# Patient Record
Sex: MALE | Race: ASIAN | NOT HISPANIC OR LATINO | URBAN - METROPOLITAN AREA
[De-identification: names, ages, dates, MRNs, and addresses within clinical notes are randomized per-mention and may not be internally consistent; named-entity substitution may affect disease eponyms.]

---

## 2017-10-18 ENCOUNTER — EMERGENCY (EMERGENCY)
Facility: HOSPITAL | Age: 47
LOS: 1 days | Discharge: HOME | End: 2017-10-18
Attending: STUDENT IN AN ORGANIZED HEALTH CARE EDUCATION/TRAINING PROGRAM | Admitting: STUDENT IN AN ORGANIZED HEALTH CARE EDUCATION/TRAINING PROGRAM
Payer: COMMERCIAL

## 2017-10-18 VITALS
HEIGHT: 71 IN | RESPIRATION RATE: 14 BRPM | WEIGHT: 188.05 LBS | DIASTOLIC BLOOD PRESSURE: 93 MMHG | SYSTOLIC BLOOD PRESSURE: 142 MMHG | HEART RATE: 77 BPM | TEMPERATURE: 97 F | OXYGEN SATURATION: 97 %

## 2017-10-18 DIAGNOSIS — K51.90 ULCERATIVE COLITIS, UNSPECIFIED, WITHOUT COMPLICATIONS: ICD-10-CM

## 2017-10-18 DIAGNOSIS — I10 ESSENTIAL (PRIMARY) HYPERTENSION: ICD-10-CM

## 2017-10-18 DIAGNOSIS — L02.01 CUTANEOUS ABSCESS OF FACE: ICD-10-CM

## 2017-10-18 DIAGNOSIS — R22.0 LOCALIZED SWELLING, MASS AND LUMP, HEAD: ICD-10-CM

## 2017-10-18 DIAGNOSIS — E03.9 HYPOTHYROIDISM, UNSPECIFIED: ICD-10-CM

## 2017-10-18 LAB
ANION GAP SERPL CALC-SCNC: 8 MMOL/L — SIGNIFICANT CHANGE UP (ref 5–17)
BASOPHILS # BLD AUTO: 0.1 K/UL — SIGNIFICANT CHANGE UP (ref 0–0.2)
BASOPHILS NFR BLD AUTO: 1 % — SIGNIFICANT CHANGE UP (ref 0–2)
BUN SERPL-MCNC: 15 MG/DL — SIGNIFICANT CHANGE UP (ref 7–23)
CALCIUM SERPL-MCNC: 9.3 MG/DL — SIGNIFICANT CHANGE UP (ref 8.5–10.1)
CHLORIDE SERPL-SCNC: 106 MMOL/L — SIGNIFICANT CHANGE UP (ref 96–108)
CO2 SERPL-SCNC: 26 MMOL/L — SIGNIFICANT CHANGE UP (ref 22–31)
CREAT SERPL-MCNC: 1 MG/DL — SIGNIFICANT CHANGE UP (ref 0.5–1.3)
EOSINOPHIL # BLD AUTO: 0.4 K/UL — SIGNIFICANT CHANGE UP (ref 0–0.5)
EOSINOPHIL NFR BLD AUTO: 3.4 % — SIGNIFICANT CHANGE UP (ref 0–6)
GLUCOSE SERPL-MCNC: 128 MG/DL — HIGH (ref 70–99)
HCT VFR BLD CALC: 46.2 % — SIGNIFICANT CHANGE UP (ref 39–50)
HGB BLD-MCNC: 14.3 G/DL — SIGNIFICANT CHANGE UP (ref 13–17)
LYMPHOCYTES # BLD AUTO: 3.5 K/UL — HIGH (ref 1–3.3)
LYMPHOCYTES # BLD AUTO: 33.4 % — SIGNIFICANT CHANGE UP (ref 13–44)
MCHC RBC-ENTMCNC: 28.2 PG — SIGNIFICANT CHANGE UP (ref 27–34)
MCHC RBC-ENTMCNC: 31.1 GM/DL — LOW (ref 32–36)
MCV RBC AUTO: 90.6 FL — SIGNIFICANT CHANGE UP (ref 80–100)
MONOCYTES # BLD AUTO: 0.7 K/UL — SIGNIFICANT CHANGE UP (ref 0–0.9)
MONOCYTES NFR BLD AUTO: 6.9 % — SIGNIFICANT CHANGE UP (ref 1–9)
NEUTROPHILS # BLD AUTO: 5.9 K/UL — SIGNIFICANT CHANGE UP (ref 1.8–7.4)
NEUTROPHILS NFR BLD AUTO: 55.3 % — SIGNIFICANT CHANGE UP (ref 43–77)
PLATELET # BLD AUTO: 421 K/UL — HIGH (ref 150–400)
POTASSIUM SERPL-MCNC: 4.1 MMOL/L — SIGNIFICANT CHANGE UP (ref 3.5–5.3)
POTASSIUM SERPL-SCNC: 4.1 MMOL/L — SIGNIFICANT CHANGE UP (ref 3.5–5.3)
RBC # BLD: 5.09 M/UL — SIGNIFICANT CHANGE UP (ref 4.2–5.8)
RBC # FLD: 13.4 % — SIGNIFICANT CHANGE UP (ref 10.3–14.5)
SODIUM SERPL-SCNC: 140 MMOL/L — SIGNIFICANT CHANGE UP (ref 135–145)
WBC # BLD: 10.6 K/UL — HIGH (ref 3.8–10.5)
WBC # FLD AUTO: 10.6 K/UL — HIGH (ref 3.8–10.5)

## 2017-10-18 PROCEDURE — 85027 COMPLETE CBC AUTOMATED: CPT

## 2017-10-18 PROCEDURE — 99284 EMERGENCY DEPT VISIT MOD MDM: CPT | Mod: 25

## 2017-10-18 PROCEDURE — 96365 THER/PROPH/DIAG IV INF INIT: CPT

## 2017-10-18 PROCEDURE — 80048 BASIC METABOLIC PNL TOTAL CA: CPT

## 2017-10-18 PROCEDURE — 36415 COLL VENOUS BLD VENIPUNCTURE: CPT

## 2017-10-18 PROCEDURE — 70480 CT ORBIT/EAR/FOSSA W/O DYE: CPT

## 2017-10-18 PROCEDURE — 87040 BLOOD CULTURE FOR BACTERIA: CPT

## 2017-10-18 PROCEDURE — 99285 EMERGENCY DEPT VISIT HI MDM: CPT | Mod: 25

## 2017-10-18 PROCEDURE — 70480 CT ORBIT/EAR/FOSSA W/O DYE: CPT | Mod: 26

## 2017-10-18 RX ORDER — VANCOMYCIN HCL 1 G
1000 VIAL (EA) INTRAVENOUS ONCE
Qty: 0 | Refills: 0 | Status: COMPLETED | OUTPATIENT
Start: 2017-10-18 | End: 2017-10-18

## 2017-10-18 RX ORDER — AZTREONAM 2 G
1 VIAL (EA) INJECTION
Qty: 20 | Refills: 0
Start: 2017-10-18 | End: 2017-10-28

## 2017-10-18 RX ADMIN — Medication 250 MILLIGRAM(S): at 02:15

## 2017-10-18 NOTE — ED ADULT NURSE NOTE - CHPI ED SYMPTOMS NEG
no bleeding at site/no red streaks/no drainage/no vomiting/no pain/no bleeding/no purulent drainage/no chills/no fever

## 2017-10-18 NOTE — ED ADULT NURSE NOTE - DISCHARGE TEACHING
f/u with Dermatologist and PMD, continue taking ordered meds and start taking new antibiotics in the morning. Boss with food

## 2017-10-18 NOTE — ED PROVIDER NOTE - OBJECTIVE STATEMENT
47 year old male with a history of HTN, UC, hypothyroid presents with right cheek swelling and abscess. Patient states he noted a small "pimple" on his right cheek 5 days ago.  He went to see a dermatologist the following day who gave patient a cortisone shot and doxycycline 100mg. Patient did not take the abx because the area did not worsen for the next 2 days. He saw another dermatologist yesterday, given Mupiricon ointment TID and then started doxy.  Last night, he noted that the abscess became bigger and more painful.  Applying warm soaks with no relief. No fevers. He also has mild right eye pain.  He has seen an ophthalmologist as well and was told his eye was healthy. Patient has SUZY, sleeps with a CPAP mask and suspects that the edge of the mask resulted in an abrasion on his cheek. Wife states he has had cellulitis and abscess multiple times in the past.  PMD Dr. Pool

## 2017-10-18 NOTE — ED PROVIDER NOTE - PROGRESS NOTE DETAILS
Patient looks well. Does not want I&D.  Will continue soak, vancomycin given.  Advised to continue doxy, bactrim added and return to the ER if worsening or no improvement

## 2017-10-18 NOTE — ED ADULT NURSE NOTE - OBJECTIVE STATEMENT
47 year old male presents to the ED with c/o right cheek swelling. A+O x 4. Wife at the bedside. States he had a cortisone shot last week in the affected area, but the swelling and redness worsened. Pt reports pain scale 6, irritation and is worried the infection might be spreading to his eye. Denies headache, fever, chills, dizziness, syncope, sob, CP, back pain, or abdominal pain. Denies NVD. S1/S2. Respirations even and unlabored. Lungs clear bilaterally. skin warm and dry. Small raised red abscess noted to the right cheek. No drainage, discharge, or bleeding noted from the abscess. Abscess hot to the touch. # 20 IV access started L AC. Blood collected and sent to lab. Will continue to monitor.

## 2017-10-18 NOTE — ED PROVIDER NOTE - HEAD, MLM
Head is atraumatic. Head shape is symmetrical. Right cheek with 3cm x 2cm abscess. Firm, tender, minimal surrounding cellulitis

## 2017-10-23 LAB
CULTURE RESULTS: SIGNIFICANT CHANGE UP
CULTURE RESULTS: SIGNIFICANT CHANGE UP
SPECIMEN SOURCE: SIGNIFICANT CHANGE UP
SPECIMEN SOURCE: SIGNIFICANT CHANGE UP

## 2019-07-25 ENCOUNTER — INPATIENT (INPATIENT)
Facility: HOSPITAL | Age: 49
LOS: 2 days | Discharge: HOME | DRG: 176 | End: 2019-07-28
Attending: INTERNAL MEDICINE | Admitting: INTERNAL MEDICINE
Payer: COMMERCIAL

## 2019-07-25 VITALS
DIASTOLIC BLOOD PRESSURE: 83 MMHG | SYSTOLIC BLOOD PRESSURE: 119 MMHG | TEMPERATURE: 99 F | OXYGEN SATURATION: 100 % | HEART RATE: 91 BPM | RESPIRATION RATE: 20 BRPM | WEIGHT: 195.11 LBS | HEIGHT: 72 IN

## 2019-07-25 LAB
ALBUMIN SERPL ELPH-MCNC: 3.9 G/DL — SIGNIFICANT CHANGE UP (ref 3.3–5)
ALP SERPL-CCNC: 106 U/L — SIGNIFICANT CHANGE UP (ref 40–120)
ALT FLD-CCNC: 44 U/L — SIGNIFICANT CHANGE UP (ref 12–78)
ANION GAP SERPL CALC-SCNC: 8 MMOL/L — SIGNIFICANT CHANGE UP (ref 5–17)
APPEARANCE UR: ABNORMAL
AST SERPL-CCNC: 20 U/L — SIGNIFICANT CHANGE UP (ref 15–37)
BACTERIA # UR AUTO: NEGATIVE — SIGNIFICANT CHANGE UP
BILIRUB SERPL-MCNC: 0.7 MG/DL — SIGNIFICANT CHANGE UP (ref 0.2–1.2)
BILIRUB UR-MCNC: NEGATIVE — SIGNIFICANT CHANGE UP
BUN SERPL-MCNC: 12 MG/DL — SIGNIFICANT CHANGE UP (ref 7–23)
CALCIUM SERPL-MCNC: 9.7 MG/DL — SIGNIFICANT CHANGE UP (ref 8.5–10.1)
CHLORIDE SERPL-SCNC: 102 MMOL/L — SIGNIFICANT CHANGE UP (ref 96–108)
CO2 SERPL-SCNC: 26 MMOL/L — SIGNIFICANT CHANGE UP (ref 22–31)
COLOR SPEC: YELLOW — SIGNIFICANT CHANGE UP
COMMENT - URINE: SIGNIFICANT CHANGE UP
CREAT SERPL-MCNC: 1.1 MG/DL — SIGNIFICANT CHANGE UP (ref 0.5–1.3)
DIFF PNL FLD: ABNORMAL
EPI CELLS # UR: SIGNIFICANT CHANGE UP
GLUCOSE SERPL-MCNC: 148 MG/DL — HIGH (ref 70–99)
GLUCOSE UR QL: NEGATIVE — SIGNIFICANT CHANGE UP
HCT VFR BLD CALC: 43.3 % — SIGNIFICANT CHANGE UP (ref 39–50)
HGB BLD-MCNC: 14.1 G/DL — SIGNIFICANT CHANGE UP (ref 13–17)
KETONES UR-MCNC: NEGATIVE — SIGNIFICANT CHANGE UP
LEUKOCYTE ESTERASE UR-ACNC: NEGATIVE — SIGNIFICANT CHANGE UP
LIDOCAIN IGE QN: 123 U/L — SIGNIFICANT CHANGE UP (ref 73–393)
MCHC RBC-ENTMCNC: 28.3 PG — SIGNIFICANT CHANGE UP (ref 27–34)
MCHC RBC-ENTMCNC: 32.6 GM/DL — SIGNIFICANT CHANGE UP (ref 32–36)
MCV RBC AUTO: 86.8 FL — SIGNIFICANT CHANGE UP (ref 80–100)
NITRITE UR-MCNC: NEGATIVE — SIGNIFICANT CHANGE UP
NRBC # BLD: 0 /100 WBCS — SIGNIFICANT CHANGE UP (ref 0–0)
PH UR: 5 — SIGNIFICANT CHANGE UP (ref 5–8)
PLATELET # BLD AUTO: 439 K/UL — HIGH (ref 150–400)
POTASSIUM SERPL-MCNC: 4 MMOL/L — SIGNIFICANT CHANGE UP (ref 3.5–5.3)
POTASSIUM SERPL-SCNC: 4 MMOL/L — SIGNIFICANT CHANGE UP (ref 3.5–5.3)
PROT SERPL-MCNC: 9.4 G/DL — HIGH (ref 6–8.3)
PROT UR-MCNC: 25 MG/DL
RBC # BLD: 4.99 M/UL — SIGNIFICANT CHANGE UP (ref 4.2–5.8)
RBC # FLD: 13.2 % — SIGNIFICANT CHANGE UP (ref 10.3–14.5)
RBC CASTS # UR COMP ASSIST: SIGNIFICANT CHANGE UP /HPF (ref 0–4)
SODIUM SERPL-SCNC: 136 MMOL/L — SIGNIFICANT CHANGE UP (ref 135–145)
SP GR SPEC: 1.01 — SIGNIFICANT CHANGE UP (ref 1.01–1.02)
UROBILINOGEN FLD QL: NEGATIVE — SIGNIFICANT CHANGE UP
WBC # BLD: 16.8 K/UL — HIGH (ref 3.8–10.5)
WBC # FLD AUTO: 16.8 K/UL — HIGH (ref 3.8–10.5)
WBC UR QL: SIGNIFICANT CHANGE UP

## 2019-07-25 RX ORDER — IOHEXOL 300 MG/ML
30 INJECTION, SOLUTION INTRAVENOUS ONCE
Refills: 0 | Status: COMPLETED | OUTPATIENT
Start: 2019-07-25 | End: 2019-07-25

## 2019-07-25 RX ORDER — KETOROLAC TROMETHAMINE 30 MG/ML
30 SYRINGE (ML) INJECTION ONCE
Refills: 0 | Status: DISCONTINUED | OUTPATIENT
Start: 2019-07-25 | End: 2019-07-25

## 2019-07-25 RX ORDER — SODIUM CHLORIDE 9 MG/ML
1000 INJECTION INTRAMUSCULAR; INTRAVENOUS; SUBCUTANEOUS ONCE
Refills: 0 | Status: COMPLETED | OUTPATIENT
Start: 2019-07-25 | End: 2019-07-25

## 2019-07-25 RX ORDER — MUPIROCIN 20 MG/G
1 OINTMENT TOPICAL
Qty: 0 | Refills: 0 | DISCHARGE

## 2019-07-25 RX ORDER — SODIUM CHLORIDE 9 MG/ML
3 INJECTION INTRAMUSCULAR; INTRAVENOUS; SUBCUTANEOUS ONCE
Refills: 0 | Status: COMPLETED | OUTPATIENT
Start: 2019-07-25 | End: 2019-07-25

## 2019-07-25 RX ADMIN — IOHEXOL 30 MILLILITER(S): 300 INJECTION, SOLUTION INTRAVENOUS at 22:14

## 2019-07-25 RX ADMIN — SODIUM CHLORIDE 1000 MILLILITER(S): 9 INJECTION INTRAMUSCULAR; INTRAVENOUS; SUBCUTANEOUS at 21:38

## 2019-07-25 RX ADMIN — Medication 30 MILLIGRAM(S): at 21:38

## 2019-07-25 RX ADMIN — SODIUM CHLORIDE 1000 MILLILITER(S): 9 INJECTION INTRAMUSCULAR; INTRAVENOUS; SUBCUTANEOUS at 22:14

## 2019-07-25 RX ADMIN — SODIUM CHLORIDE 3 MILLILITER(S): 9 INJECTION INTRAMUSCULAR; INTRAVENOUS; SUBCUTANEOUS at 21:38

## 2019-07-25 NOTE — ED PROVIDER NOTE - PHYSICAL EXAMINATION
Physical Exam:  General: Well developed male, well nourished, mild acute distress d/t pain  CV: RRR, +S1/S2, no murmurs, rubs or gallops  Respiratory: CTA B/L, No W/R/R  Abdominal: Guarding on exam because of pain, generalized TTP, +BSx4, +R CVA tenderness  Neurology: AAOx3

## 2019-07-25 NOTE — ED PROVIDER NOTE - OBJECTIVE STATEMENT
Patient is a 50 y/o male with PMHx of colitis (on Lialda and Budesonide), HTN, and hypothyroidism who presents with a 4 days history of R flank pain. Pain is located along the R mid-axillary line, lateral to the R CVA, 10/10 stabbing, sharp pain, exacerbated by movement and taking a deep breath, without radiation. Pain was intermittent until 4-5pm today when it became a constant stabbing pain, prompting his visit to \[facility]"" ED. Patient saw his PCP (Dr. Pool) for the pain Monday and said he did a CXR and EKG, both which were normal, and he did not prescribe any medication. Patient describes associated shortness of breath as he catches his breath due to pain in his R flank with deep breathing. Denies ever having pain like this before, denies previous hx of kidney stones. Endorses chills this morning. Denies fever, n/v/d, urinary symptoms, blood in the urine, blood in the stool.

## 2019-07-25 NOTE — ED PROVIDER NOTE - CLINICAL SUMMARY MEDICAL DECISION MAKING FREE TEXT BOX
Pt presenting with right flank pain with radiation to RUQ x days now constant.  Concern for kidney stone vs MSK vs intra-abdominal pathology.  Will obtain screening labs, UA, and medicate for symptoms.  Will obtain CT based on results of labs

## 2019-07-25 NOTE — ED ADULT NURSE NOTE - OBJECTIVE STATEMENT
patient a/o x 4 with a calm affect c/o right flank pains with intermittent spikes in pain level.  patient denies leg pains or chest pains.

## 2019-07-25 NOTE — ED ADULT TRIAGE NOTE - CHIEF COMPLAINT QUOTE
worsening right flank/back pain +SOB was seen by MD on Monday d/c'd with no new medications was told "he's ok"

## 2019-07-25 NOTE — ED PROVIDER NOTE - ATTENDING CONTRIBUTION TO CARE
Pt is a 50 yo male who presents to the ED with a cc of right flank pain.  PMHx of colitis (on Lialda and Budesonide), HTN, and hypothyroidism.  Pt reports that he has been suffering from right flank pain for the last several days but that this afternoon the pain which has originally been intermittent in nature became constant.  He reports that the pain is sharp and stabbing in nature and radiates to his RUQ.  Pain is worsen with movement and deep breathing.  He reports mild SOB because he reports that taking a tabby breath worsens the pain.  Denies prior similar episodes.  Denies trauma to the region.  Denies numbness or tingling to ext.  Denies fever, chills, N/V, CP.  Denies dysuria, frequency, urgency.  Pt reports that several day ago he had right upper back/shoulder pain.  He was seen by his PMD for this and underwent a work up with no acute findings.  Pt reports that the pain has since improved.  Remainder of chart completed by myself

## 2019-07-25 NOTE — ED PROVIDER NOTE - NS ED ROS FT
Constitutional: denies fever or chills  Respiratory: +SOB, denies cough or wheezing  Cardiovascular: denies CP  Gastrointestinal: + generalized abdominal pain, +R flank pain, denies nausea, vomiting, diarrhea, constipation, or bloody stools  Genitourinary: denies painful urination, increased frequency, urgency, or bloody urine  Musculoskeletal: denies muscle aches  ROS negative except as noted above

## 2019-07-26 DIAGNOSIS — G47.33 OBSTRUCTIVE SLEEP APNEA (ADULT) (PEDIATRIC): ICD-10-CM

## 2019-07-26 DIAGNOSIS — G47.30 SLEEP APNEA, UNSPECIFIED: ICD-10-CM

## 2019-07-26 DIAGNOSIS — I10 ESSENTIAL (PRIMARY) HYPERTENSION: ICD-10-CM

## 2019-07-26 DIAGNOSIS — I26.99 OTHER PULMONARY EMBOLISM WITHOUT ACUTE COR PULMONALE: ICD-10-CM

## 2019-07-26 DIAGNOSIS — Z29.9 ENCOUNTER FOR PROPHYLACTIC MEASURES, UNSPECIFIED: ICD-10-CM

## 2019-07-26 DIAGNOSIS — J45.909 UNSPECIFIED ASTHMA, UNCOMPLICATED: ICD-10-CM

## 2019-07-26 DIAGNOSIS — E03.9 HYPOTHYROIDISM, UNSPECIFIED: ICD-10-CM

## 2019-07-26 DIAGNOSIS — K52.9 NONINFECTIVE GASTROENTERITIS AND COLITIS, UNSPECIFIED: ICD-10-CM

## 2019-07-26 LAB
ALBUMIN SERPL ELPH-MCNC: 3.1 G/DL — LOW (ref 3.3–5)
ALP SERPL-CCNC: 84 U/L — SIGNIFICANT CHANGE UP (ref 40–120)
ALT FLD-CCNC: 37 U/L — SIGNIFICANT CHANGE UP (ref 12–78)
ANION GAP SERPL CALC-SCNC: 9 MMOL/L — SIGNIFICANT CHANGE UP (ref 5–17)
APTT BLD: 143.4 SEC — CRITICAL HIGH (ref 28.5–37)
AST SERPL-CCNC: 19 U/L — SIGNIFICANT CHANGE UP (ref 15–37)
BILIRUB SERPL-MCNC: 0.9 MG/DL — SIGNIFICANT CHANGE UP (ref 0.2–1.2)
BUN SERPL-MCNC: 11 MG/DL — SIGNIFICANT CHANGE UP (ref 7–23)
CALCIUM SERPL-MCNC: 8.1 MG/DL — LOW (ref 8.5–10.1)
CHLORIDE SERPL-SCNC: 107 MMOL/L — SIGNIFICANT CHANGE UP (ref 96–108)
CO2 SERPL-SCNC: 23 MMOL/L — SIGNIFICANT CHANGE UP (ref 22–31)
CREAT SERPL-MCNC: 0.83 MG/DL — SIGNIFICANT CHANGE UP (ref 0.5–1.3)
D DIMER BLD IA.RAPID-MCNC: 956 NG/ML DDU — HIGH
GLUCOSE SERPL-MCNC: 141 MG/DL — HIGH (ref 70–99)
HCT VFR BLD CALC: 34.6 % — LOW (ref 39–50)
HGB BLD-MCNC: 11.4 G/DL — LOW (ref 13–17)
INR BLD: 1.29 RATIO — HIGH (ref 0.88–1.16)
MAGNESIUM SERPL-MCNC: 1.8 MG/DL — SIGNIFICANT CHANGE UP (ref 1.6–2.6)
MCHC RBC-ENTMCNC: 28.2 PG — SIGNIFICANT CHANGE UP (ref 27–34)
MCHC RBC-ENTMCNC: 32.9 GM/DL — SIGNIFICANT CHANGE UP (ref 32–36)
MCV RBC AUTO: 85.6 FL — SIGNIFICANT CHANGE UP (ref 80–100)
NRBC # BLD: 0 /100 WBCS — SIGNIFICANT CHANGE UP (ref 0–0)
NT-PROBNP SERPL-SCNC: 30 PG/ML — SIGNIFICANT CHANGE UP (ref 0–125)
NT-PROBNP SERPL-SCNC: 96 PG/ML — SIGNIFICANT CHANGE UP (ref 0–125)
PHOSPHATE SERPL-MCNC: 2.9 MG/DL — SIGNIFICANT CHANGE UP (ref 2.5–4.5)
PLATELET # BLD AUTO: 386 K/UL — SIGNIFICANT CHANGE UP (ref 150–400)
POTASSIUM SERPL-MCNC: 3.7 MMOL/L — SIGNIFICANT CHANGE UP (ref 3.5–5.3)
POTASSIUM SERPL-SCNC: 3.7 MMOL/L — SIGNIFICANT CHANGE UP (ref 3.5–5.3)
PROT SERPL-MCNC: 7.5 G/DL — SIGNIFICANT CHANGE UP (ref 6–8.3)
PROTHROM AB SERPL-ACNC: 14.8 SEC — HIGH (ref 10–12.9)
RBC # BLD: 4.04 M/UL — LOW (ref 4.2–5.8)
RBC # FLD: 13.2 % — SIGNIFICANT CHANGE UP (ref 10.3–14.5)
SODIUM SERPL-SCNC: 139 MMOL/L — SIGNIFICANT CHANGE UP (ref 135–145)
T4 AB SER-ACNC: 7.1 UG/DL — SIGNIFICANT CHANGE UP (ref 4.6–12)
TROPONIN I SERPL-MCNC: <.015 NG/ML — SIGNIFICANT CHANGE UP (ref 0.01–0.04)
TSH SERPL-MCNC: 2.35 UIU/ML — SIGNIFICANT CHANGE UP (ref 0.36–3.74)
WBC # BLD: 14.16 K/UL — HIGH (ref 3.8–10.5)
WBC # FLD AUTO: 14.16 K/UL — HIGH (ref 3.8–10.5)

## 2019-07-26 PROCEDURE — 78582 LUNG VENTILAT&PERFUS IMAGING: CPT | Mod: 26

## 2019-07-26 PROCEDURE — 71045 X-RAY EXAM CHEST 1 VIEW: CPT | Mod: 26

## 2019-07-26 PROCEDURE — 99284 EMERGENCY DEPT VISIT MOD MDM: CPT

## 2019-07-26 PROCEDURE — 74177 CT ABD & PELVIS W/CONTRAST: CPT | Mod: 26

## 2019-07-26 PROCEDURE — 93010 ELECTROCARDIOGRAM REPORT: CPT

## 2019-07-26 PROCEDURE — 93970 EXTREMITY STUDY: CPT | Mod: 26

## 2019-07-26 RX ORDER — HEPARIN SODIUM 5000 [USP'U]/ML
7000 INJECTION INTRAVENOUS; SUBCUTANEOUS ONCE
Refills: 0 | Status: COMPLETED | OUTPATIENT
Start: 2019-07-26 | End: 2019-07-26

## 2019-07-26 RX ORDER — LOSARTAN/HYDROCHLOROTHIAZIDE 100MG-25MG
1 TABLET ORAL
Qty: 0 | Refills: 0 | DISCHARGE

## 2019-07-26 RX ORDER — MESALAMINE 400 MG
2 TABLET, DELAYED RELEASE (ENTERIC COATED) ORAL
Qty: 0 | Refills: 0 | DISCHARGE

## 2019-07-26 RX ORDER — LEVOTHYROXINE SODIUM 125 MCG
50 TABLET ORAL DAILY
Refills: 0 | Status: DISCONTINUED | OUTPATIENT
Start: 2019-07-26 | End: 2019-07-28

## 2019-07-26 RX ORDER — KETOROLAC TROMETHAMINE 30 MG/ML
30 SYRINGE (ML) INJECTION
Refills: 0 | Status: DISCONTINUED | OUTPATIENT
Start: 2019-07-26 | End: 2019-07-26

## 2019-07-26 RX ORDER — BUDESONIDE, MICRONIZED 100 %
9 POWDER (GRAM) MISCELLANEOUS
Qty: 0 | Refills: 0 | DISCHARGE

## 2019-07-26 RX ORDER — LACTOBACILLUS ACIDOPHILUS 100MM CELL
1 CAPSULE ORAL THREE TIMES A DAY
Refills: 0 | Status: DISCONTINUED | OUTPATIENT
Start: 2019-07-26 | End: 2019-07-28

## 2019-07-26 RX ORDER — RIVAROXABAN 15 MG-20MG
15 KIT ORAL
Refills: 0 | Status: DISCONTINUED | OUTPATIENT
Start: 2019-07-26 | End: 2019-07-28

## 2019-07-26 RX ORDER — PANTOPRAZOLE SODIUM 20 MG/1
40 TABLET, DELAYED RELEASE ORAL
Refills: 0 | Status: DISCONTINUED | OUTPATIENT
Start: 2019-07-26 | End: 2019-07-28

## 2019-07-26 RX ORDER — LOSARTAN POTASSIUM 100 MG/1
100 TABLET, FILM COATED ORAL DAILY
Refills: 0 | Status: DISCONTINUED | OUTPATIENT
Start: 2019-07-26 | End: 2019-07-28

## 2019-07-26 RX ORDER — HEPARIN SODIUM 5000 [USP'U]/ML
7000 INJECTION INTRAVENOUS; SUBCUTANEOUS EVERY 6 HOURS
Refills: 0 | Status: DISCONTINUED | OUTPATIENT
Start: 2019-07-26 | End: 2019-07-26

## 2019-07-26 RX ORDER — HEPARIN SODIUM 5000 [USP'U]/ML
INJECTION INTRAVENOUS; SUBCUTANEOUS
Qty: 25000 | Refills: 0 | Status: DISCONTINUED | OUTPATIENT
Start: 2019-07-26 | End: 2019-07-26

## 2019-07-26 RX ORDER — HEPARIN SODIUM 5000 [USP'U]/ML
3500 INJECTION INTRAVENOUS; SUBCUTANEOUS EVERY 6 HOURS
Refills: 0 | Status: DISCONTINUED | OUTPATIENT
Start: 2019-07-26 | End: 2019-07-26

## 2019-07-26 RX ORDER — ASPIRIN/CALCIUM CARB/MAGNESIUM 324 MG
325 TABLET ORAL ONCE
Refills: 0 | Status: COMPLETED | OUTPATIENT
Start: 2019-07-26 | End: 2019-07-26

## 2019-07-26 RX ORDER — LEVOTHYROXINE SODIUM 125 MCG
1 TABLET ORAL
Qty: 0 | Refills: 0 | DISCHARGE

## 2019-07-26 RX ORDER — KETOROLAC TROMETHAMINE 30 MG/ML
15 SYRINGE (ML) INJECTION
Refills: 0 | Status: DISCONTINUED | OUTPATIENT
Start: 2019-07-26 | End: 2019-07-26

## 2019-07-26 RX ORDER — ACETAMINOPHEN 500 MG
650 TABLET ORAL EVERY 6 HOURS
Refills: 0 | Status: DISCONTINUED | OUTPATIENT
Start: 2019-07-26 | End: 2019-07-28

## 2019-07-26 RX ADMIN — HEPARIN SODIUM 0 UNIT(S)/HR: 5000 INJECTION INTRAVENOUS; SUBCUTANEOUS at 08:44

## 2019-07-26 RX ADMIN — HEPARIN SODIUM 1600 UNIT(S)/HR: 5000 INJECTION INTRAVENOUS; SUBCUTANEOUS at 01:30

## 2019-07-26 RX ADMIN — Medication 30 MILLIGRAM(S): at 07:25

## 2019-07-26 RX ADMIN — PANTOPRAZOLE SODIUM 40 MILLIGRAM(S): 20 TABLET, DELAYED RELEASE ORAL at 07:56

## 2019-07-26 RX ADMIN — Medication 650 MILLIGRAM(S): at 20:05

## 2019-07-26 RX ADMIN — Medication 30 MILLIGRAM(S): at 08:46

## 2019-07-26 RX ADMIN — Medication 1 TABLET(S): at 22:45

## 2019-07-26 RX ADMIN — Medication 1 TABLET(S): at 15:19

## 2019-07-26 RX ADMIN — Medication 50 MICROGRAM(S): at 11:08

## 2019-07-26 RX ADMIN — RIVAROXABAN 15 MILLIGRAM(S): KIT at 17:25

## 2019-07-26 RX ADMIN — HEPARIN SODIUM 5000 UNIT(S): 5000 INJECTION INTRAVENOUS; SUBCUTANEOUS at 01:26

## 2019-07-26 NOTE — H&P ADULT - NSICDXPASTMEDICALHX_GEN_ALL_CORE_FT
PAST MEDICAL HISTORY:  Asthmatic bronchitis     Colitis     Hypertension     Hypothyroidism PAST MEDICAL HISTORY:  Colitis     Hypertension     Hypothyroidism     SUZY on CPAP

## 2019-07-26 NOTE — H&P ADULT - PROBLEM SELECTOR PLAN 4
due to abnormal CT abd findings of thickenned colon may require colonoscopy ,GI consult called due to abnormal CT abd findings of thickened colon may require colonoscopy ,GI consult called .Patient has outpatient colonoscopy scheduled on 08/30/19

## 2019-07-26 NOTE — H&P ADULT - NSHPLABSRESULTS_GEN_ALL_CORE
< from: US Duplex Venous Lower Ext Complete, Bilateral (07.26.19 @ 02:07) >    EXAM:  US DPLX LWR EXT VEINS COMPL BI                            PROCEDURE DATE:  07/26/2019          INTERPRETATION:  Clinical indication: Multiple PEs.    Technique:  Grayscale, color Doppler and spectral Doppler ultrasound was   utilized to evaluate bilateral lower extremity deep venous system.      Comparison: 12/2/2011.    Findings: There is no thrombosis in bilateral common femoral veins,   femoral veins or popliteal veins. Visualized calf veins are patent.    Impression:     No deep veinthrombosis in either lower extremity.    < end of copied text >    < from: CT Abdomen and Pelvis w/ Oral Cont and w/ IV Cont (07.26.19 @ 00:38) >    EXAM:  CT ABDOMEN AND PELVIS OC IC                            PROCEDURE DATE:  07/26/2019          INTERPRETATION:  CLINICAL INFORMATION: Right-sided abdominal pain    COMPARISON: None.    PROCEDURE:   CT of the Abdomen and Pelvis was performed with intravenous contrast.   Intravenous contrast: 90 ml Omnipaque 350. 10 ml discarded.  Oral contrast: positive contrast was administered.  Sagittal and coronal reformats were performed.    FINDINGS:    LOWER CHEST: Multiple filling defects in the bilateral lower lobe lobar   and segmental pulmonary arteries consistent with pulmonary emboli. Mild   bibasilar atelectasis. Trace right pleural effusion.    LIVER: Within normal limits.  BILE DUCTS: Normal caliber.  GALLBLADDER: Within normal limits.  SPLEEN: Within normal limits.  PANCREAS: Within normal limits.  ADRENALS: Within normal limits.  KIDNEYS/URETERS: Enhance symmetrically. No hydronephrosis. Mild   nonspecific bilateral perinephric stranding. Retroaortic left renal vein.    BLADDER: Within normal limits.  REPRODUCTIVE ORGANS: Mildly enlarged prostate. There is a nonspecific 1.0   cm calcification in the penis dorsally.    BOWEL: No bowel obstruction. Appendix is unremarkable. Mild wall   thickening versus underdistention of the sigmoid colon; correlate   clinically for colitis and consider colonoscopy correlation.  PERITONEUM: No ascites.  VESSELS: Normal caliber abdominal aorta.  RETROPERITONEUM: No lymphadenopathy.    ABDOMINAL WALL: Tiny fat-containing left inguinal hernia.  BONES: Mild degenerative changes in the spine.    IMPRESSION:     Bilateral lower lobe lobar and segmental pulmonary emboli.    Mild wall thickening versus underdistention of the sigmoid colon;   correlate clinically for colitis and consider colonoscopy correlation.    < end of copied text >

## 2019-07-26 NOTE — H&P ADULT - ATTENDING COMMENTS
Patient is a 50 y/o male with PMHx of SUZY ON CPAP at home ,  colitis (on Lialda and Budesonide), HTN, and hypothyroidism who presents with a 4 days history of R flank pain ,exacerbated by movement and taking a deep breath, without radiation. Pain was intermittent until last night  when it became a constant and patient came to  to [facility] ED. Patient saw his PCP (Dr. Ferguson) for the pain Monday and said he did a CXR and EKG, both which were normal, and he did not prescribe any medication. Patient describes associated shortness of breath since last night ,denies cp palpitations dizziness  Denies ever having pain like this before, denies previous hx of kidney stones.  Denies fever, n/v/d, urinary symptoms, blood in the urine, blood in the stool. Admits recent traveling by plane to [facility] and frequent car trips to [place] and [place] Seen by cardiologist Dr FERGUSON and pulmonologist Dr Vallejo on admission. Hemonc consult requested by PCP Dr Ferguson to advise on OAC .Patient was diagnosed with bilateral pulmonary embolism and started on heparin drip .Leg doppler was negative for DVT .ADMITTED TO MONITORED UNIT .

## 2019-07-26 NOTE — H&P ADULT - HISTORY OF PRESENT ILLNESS
Patient is a 50 y/o male with PMHx of SUZY ON CPAP at home ,  colitis (on Lialda and Budesonide), HTN, and hypothyroidism who presents with a 4 days history of R flank pain ,exacerbated by movement and taking a deep breath, without radiation. Pain was intermittent until last night  when it became a constant and patient came to  to [facility] ED. Patient saw his PCP (Dr. Ferguson) for the pain Monday and said he did a CXR and EKG, both which were normal, and he did not prescribe any medication. Patient describes associated shortness of breath since last night ,denies cp palpitations dizziness  Denies ever having pain like this before, denies previous hx of kidney stones.  Denies fever, n/v/d, urinary symptoms, blood in the urine, blood in the stool. Admits recent traveling by plane to [facility] and frequent car trips to [place] and [place] Seen by cardiologist Dr FERGUSON and pulmonologist Dr Vallejo on admission Hemonc consult requested by PCP Dr Ferguson to advise on OAC .Patient was diagnosed with bilateral pulmonary embolism and started on heparin drip. Leg doppler was negative for DVT .ADMITTED TO MONITORED UNIT .

## 2019-07-26 NOTE — H&P ADULT - ASSESSMENT
Patient is a 48 y/o male with PMHx of SUZY ON CPAP at home ,  colitis (on Lialda and Budesonide), HTN, and hypothyroidism who presents with a 4 days history of R flank pain ,exacerbated by movement and taking a deep breath, without radiation. Pain was intermittent until last night  when it became a constant and patient came to  to [facility] ED. Patient saw his PCP (Dr. Ferguson) for the pain Monday and said he did a CXR and EKG, both which were normal, and he did not prescribe any medication. Patient describes associated shortness of breath since last night ,denies cp palpitations dizziness  Denies ever having pain like this before, denies previous hx of kidney stones.  Denies fever, n/v/d, urinary symptoms, blood in the urine, blood in the stool. Admits recent traveling by plane to [facility] and frequent car trips to [place] and [place] Seen by cardiologist Dr FERGUSON and pulmonologist Dr Vallejo on admission Hemonc consult requested by PCP Dr Ferguson to advise on OAC .Patient was diagnosed with bilateral pulmonary embolism and started on heparin drip .Leg doppler was negative for DVT .ADMITTED TO MONITORED UNIT .

## 2019-07-26 NOTE — PHYSICAL THERAPY INITIAL EVALUATION ADULT - ADDITIONAL COMMENTS
pt lives in house w/ 5 steps/rail to enter.  Pt is a community ambulator and is able to drive a car.

## 2019-07-26 NOTE — CONSULT NOTE ADULT - SUBJECTIVE AND OBJECTIVE BOX
PULMONARY/CRITICAL CARE        Patient is a 49y old  Male who presents with a chief complaint of sob and flank and shoulder pain on a left (2019 07:49)  50 y/o male with PMHx of colitis (on Lialda and Budesonide), HTN, and hypothyroidism who presents with a 4 days history of R flank pain. Pain is located along the R mid-axillary line, lateral to the R CVA, 10/10 stabbing, sharp pain, exacerbated by movement and taking a deep breath, without radiation. Pain was intermittent until 4-5pm today when it became a constant stabbing pain, prompting his visit to [facility] ED. Patient saw his PCP (Dr. Pool) for the pain Monday and said he did a CXR and EKG, both which were normal, and he did not prescribe any medication. Patient describes associated shortness of breath as he catches his breath due to pain in his R flank with deep breathing. Denies ever having pain like this before, denies previous hx of kidney stones. Endorses chills this morning. Denies fever, n/v/d, urinary symptoms, blood in the urine, blood in the stool. Has exertional sob for few days.  Was on airplane flight and car ride recently  BRIEF HOSPITAL COURSE: ***  No prior VTE  No fam hx VTE  Events last 24 hours: ***    PAST MEDICAL & SURGICAL HISTORY:  Asthmatic bronchitis  Colitis  Hypothyroidism  Hypertension  SUZY on cpap  No significant past surgical history    Allergies    No Known Allergies    Intolerances      FAMILY HISTORY:      Review of Systems:  CONSTITUTIONAL: No fever, chills, or fatigue  EYES: No eye pain, visual disturbances, or discharge  ENMT:  No difficulty hearing, tinnitus, vertigo; No sinus or throat pain  NECK: No pain or stiffness  RESPIRATORY: No cough, wheezing, chills or hemoptysis; has exertional shortness of breath  CARDIOVASCULAR: right chest pain, no  palpitations, dizziness, or leg swelling  GASTROINTESTINAL: No abdominal or epigastric pain. No nausea, vomiting, or hematemesis; No diarrhea or constipation. No melena or hematochezia.  GENITOURINARY: No dysuria, frequency, hematuria, or incontinence  NEUROLOGICAL: No headaches, memory loss, loss of strength, numbness, or tremors  SKIN: No itching, burning, rashes, or lesions   MUSCULOSKELETAL: No joint pain or swelling; No muscle, back, or extremity pain  PSYCHIATRIC: No depression, anxiety, mood swings, or difficulty sleeping      Medications:    losartan 100 milliGRAM(s) Oral daily      acetaminophen   Tablet .. 650 milliGRAM(s) Oral every 6 hours PRN  ketorolac   Injectable 15 milliGRAM(s) IV Push four times a day PRN  ketorolac   Injectable 30 milliGRAM(s) IV Push four times a day PRN      rivaroxaban 15 milliGRAM(s) Oral two times a day with meals    pantoprazole    Tablet 40 milliGRAM(s) Oral before breakfast      levothyroxine 50 MICROGram(s) Oral daily          budesonide ER 9 milliGRAM(s) 9 milliGRAM(s) Oral every 48 hours  lialda  1.2 2 Tablet(s) 2 Tablet(s) Oral daily          ICU Vital Signs Last 24 Hrs  T(C): 36.3 (2019 07:20), Max: 37.1 (2019 20:10)  T(F): 97.3 (2019 07:20), Max: 98.7 (2019 20:10)  HR: 80 (2019 07:20) (70 - 91)  BP: 133/88 (2019 07:20) (119/83 - 133/95)  BP(mean): --  ABP: --  ABP(mean): --  RR: 16 (2019 07:20) (16 - 20)  SpO2: 97% (2019 07:20) (97% - 100%)    Vital Signs Last 24 Hrs  T(C): 36.3 (2019 07:20), Max: 37.1 (2019 20:10)  T(F): 97.3 (2019 07:20), Max: 98.7 (2019 20:10)  HR: 80 (2019 07:20) (70 - 91)  BP: 133/88 (2019 07:20) (119/83 - 133/95)  BP(mean): --  RR: 16 (2019 07:20) (16 - 20)  SpO2: 97% (2019 07:20) (97% - 100%)        I&O's Detail    2019 07:01  -  2019 07:00  --------------------------------------------------------  IN:    heparin  Infusion.: 32 mL    Sodium Chloride 0.9% IV Bolus: 2000 mL  Total IN: 2032 mL    OUT:  Total OUT: 0 mL    Total NET:  mL            LABS:                        11.4   14.16 )-----------( 386      ( 2019 07:41 )             34.6         139  |  107  |  11  ----------------------------<  141<H>  3.7   |  23  |  0.83    Ca    8.1<L>      2019 07:41  Phos  2.9       Mg     1.8         TPro  7.5  /  Alb  3.1<L>  /  TBili  0.9  /  DBili  x   /  AST  19  /  ALT  37  /  AlkPhos  84        CARDIAC MARKERS ( 2019 07:41 )  <.015 ng/mL / x     / x     / x     / x      CARDIAC MARKERS ( 2019 01:11 )  <.015 ng/mL / x     / x     / x     / x          CAPILLARY BLOOD GLUCOSE        PT/INR - ( 2019 07:41 )   PT: 14.8 sec;   INR: 1.29 ratio         PTT - ( 2019 07:41 )  PTT:143.4 sec  Urinalysis Basic - ( 2019 21:42 )    Color: Yellow / Appearance: Slightly Turbid / S.015 / pH: x  Gluc: x / Ketone: Negative  / Bili: Negative / Urobili: Negative   Blood: x / Protein: 25 mg/dL / Nitrite: Negative   Leuk Esterase: Negative / RBC: 0-2 /HPF / WBC 0-2   Sq Epi: x / Non Sq Epi: Occasional / Bacteria: Negative      CULTURES:      Physical Examination:    General: No acute distress.      HEENT: Pupils equal, reactive to light.  Symmetric.    PULM: Clear to auscultation bilaterally, no significant sputum production    CVS: Regular rate and rhythm, no murmurs, rubs, or gallops    ABD: Soft, nondistended, nontender, normoactive bowel sounds, no masses    EXT: No edema, nontender calves    SKIN: Warm and well perfused, no rashes noted.    NEURO: Alert, oriented, interactive, nonfocal    RADIOLOGY: ***  < from: US Duplex Venous Lower Ext Complete, Bilateral (19 @ 02:07) >  EXAM:  US DPLX LWR EXT VEINS COMPL BI                            PROCEDURE DATE:  2019          INTERPRETATION:  Clinical indication: Multiple PEs.    Technique:  Grayscale, color Doppler and spectral Doppler ultrasound was   utilized to evaluate bilateral lower extremity deep venous system.      Comparison: 2011.    Findings: There is no thrombosis in bilateral common femoral veins,   femoral veins or popliteal veins. Visualized calf veins are patent.    Impression:     No deep veinthrombosis in either lower extremity.                FERNANDA REAVES M.D., ATTENDING RADIOLOGIST  This document has been electronically signed. 2019  2:20    < end of copied text >  < from: CT Abdomen and Pelvis w/ Oral Cont and w/ IV Cont (19 @ 00:38) >  EXAM:  CT ABDOMEN AND PELVIS OC IC                            PROCEDURE DATE:  2019          INTERPRETATION:  CLINICAL INFORMATION: Right-sided abdominal pain    COMPARISON: None.    PROCEDURE:   CT of the Abdomen and Pelvis was performed with intravenous contrast.   Intravenous contrast: 90 ml Omnipaque 350. 10 ml discarded.  Oral contrast: positive contrast was administered.  Sagittal and coronal reformats were performed.    FINDINGS:    LOWER CHEST: Multiple filling defects in the bilateral lower lobe lobar   and segmental pulmonary arteries consistent with pulmonary emboli. Mild   bibasilar atelectasis. Trace right pleural effusion.    LIVER: Within normal limits.  BILE DUCTS: Normal caliber.  GALLBLADDER: Within normal limits.  SPLEEN: Within normal limits.  PANCREAS: Within normal limits.  ADRENALS: Within normal limits.  KIDNEYS/URETERS: Enhance symmetrically. No hydronephrosis. Mild   nonspecific bilateral perinephric stranding. Retroaortic left renal vein.    BLADDER: Within normal limits.  REPRODUCTIVE ORGANS: Mildly enlarged prostate. There is a nonspecific 1.0   cm calcification in the penis dorsally.    BOWEL: No bowel obstruction. Appendix is unremarkable. Mild wall   thickening versus underdistention of the sigmoid colon; correlate   clinically for colitis and consider colonoscopy correlation.  PERITONEUM: No ascites.  VESSELS: Normal caliber abdominal aorta.  RETROPERITONEUM: No lymphadenopathy.    ABDOMINAL WALL: Tiny fat-containing left inguinal hernia.  BONES: Mild degenerative changes in the spine.    IMPRESSION:     Bilateral lower lobe lobar and segmental pulmonary emboli.    Mild wall thickening versus underdistention of the sigmoid colon;   correlate clinically for colitis and consider colonoscopy correlation.    Findings were discussed with Dr. Corey on 2019 12:53 AM with   readback.                MYAH MCKEON MD., ATTENDING RADIOLOGIST  This document has been electronically signed. 2019 12:55AM              < end of copied text >    CRITICAL CARE TIME SPENT: ***

## 2019-07-26 NOTE — CONSULT NOTE ADULT - SUBJECTIVE AND OBJECTIVE BOX
Chief Complaint:  Patient is a 49y old  Male who presents with a chief complaint of sob and flank pain on a left (2019 08:53)    SUZY on CPAP  Asthmatic bronchitis  Colitis  Hypothyroidism  Hypertension  No significant past surgical history    hpi:  Patient is a 48 y/o male with PMHx of colitis (on Lialda and Budesonide), HTN, and hypothyroidism who presents with a 4 days history of R flank pain. Pain is located along the R mid-axillary line, lateral to the R CVA, 10/10 stabbing, sharp pain, exacerbated by movement and taking a deep breath, without radiation. Pain was intermittent until 4-5pm today when it became a constant stabbing pain, prompting his visit to [facility] ED. Patient saw his PCP (Dr. Pool) for the pain Monday and said he did a CXR and EKG, both which were normal, and he did not prescribe any medication. Patient describes associated shortness of breath as he catches his breath due to pain in his R flank with deep breathing. Denies ever having pain like this before, denies previous hx of kidney stones. Endorses chills this morning. Denies fever, n/v/d, urinary symptoms, blood in the urine, blood in the stool.    gi consulted for colitis. chart reviewed. pt seen and examined. came to hospital for  further eval of r flank pain x 4 days, found to have b/l pe on imaging, started on heparin drip. pt denies overt abd pain and diarrhea. last  bm yesterday, formed and brown. has hx of uc, dxd 10 yrs ago, follows at [facility] w dr marsh. maintained on lialda and budenoside. last colonoscopy 3.5 yrs ago, polyp resected, otherwise no acute findings, states he is due for repeat scope this yr. last flare ~1.5 months ago but not hospitalized. has never had egd. no prior abd sx. fhx nc.denies f/c/n/v/abd pain/d/c/melena/brbpr. denies recent sick contacts, hospitalizations and abx use    recent vs/labs/imaging reviewed- avss  downtrending leukocytosis  hgb 11.4 plts wnl  inr 1.29 ptt 143 on heparin gtt  ct a/p as below  on us no le dvt      No Known Allergies      acetaminophen   Tablet .. 650 milliGRAM(s) Oral every 6 hours PRN  budesonide ER 9 milliGRAM(s) 9 milliGRAM(s) Oral every 48 hours  levothyroxine 50 MICROGram(s) Oral daily  lialda  1.2 2 Tablet(s) 2 Tablet(s) Oral daily  losartan 100 milliGRAM(s) Oral daily  pantoprazole    Tablet 40 milliGRAM(s) Oral before breakfast  rivaroxaban 15 milliGRAM(s) Oral two times a day with meals        FAMILY HISTORY:        Review of Systems:    General:  No wt loss, fevers, chills, night sweats, fatigue  Eyes:  Good vision, no reported pain  ENT:  No sore throat, pain, runny nose, dysphagia  CV:  No pain, palpitations, no lightheadedness  Resp:  No dyspnea, cough, tachypnea, wheezing  GI: see above  :  No pain, bleeding, incontinence, nocturia  Muscle: r flank/back pain  Neuro:  No weakness, tingling, memory problems  Psych:  No fatigue, insomnia, mood problems, depression  Endocrine:  No polyuria, polydypsia, cold/heat intolerance  Heme:  No petechiae, ecchymosis, easy bruisability  Skin:  No rash, tattoos, scars, edema    Relevant Family History:   n/c    Relevant Social History: n/c      Physical Exam:    Vital Signs:  Vital Signs Last 24 Hrs  T(C): 36.3 (2019 07:20), Max: 37.1 (2019 20:10)  T(F): 97.3 (2019 07:20), Max: 98.7 (2019 20:10)  HR: 80 (2019 07:20) (70 - 91)  BP: 133/88 (2019 07:20) (119/83 - 133/95)  BP(mean): --  RR: 16 (2019 07:20) (16 - 20)  SpO2: 97% (2019 07:20) (97% - 100%)  Daily Height in cm: 182.88 (2019 20:10)    Daily Weight in k.4 (2019 04:07)    General:  nad  HEENT:  NC/AT  Chest: dec bs  Cardiovascular:  Regular rhythm, S1, S2  Abdomen:  soft nt mild dt  Extremities:  no edema  Skin:  No rash  Neuro/Psych:  A&O x3    Laboratory:                            11.4   14.16 )-----------( 386      ( 2019 07:41 )             34.6         139  |  107  |  11  ----------------------------<  141<H>  3.7   |  23  |  0.83    Ca    8.1<L>      2019 07:41  Phos  2.9       Mg     1.8         TPro  7.5  /  Alb  3.1<L>  /  TBili  0.9  /  DBili  x   /  AST  19  /  ALT  37  /  AlkPhos  84      LIVER FUNCTIONS - ( 2019 07:41 )  Alb: 3.1 g/dL / Pro: 7.5 g/dL / ALK PHOS: 84 U/L / ALT: 37 U/L / AST: 19 U/L / GGT: x           PT/INR - ( 2019 07:41 )   PT: 14.8 sec;   INR: 1.29 ratio         PTT - ( 2019 07:41 )  PTT:143.4 sec  Urinalysis Basic - ( 2019 21:42 )    Color: Yellow / Appearance: Slightly Turbid / S.015 / pH: x  Gluc: x / Ketone: Negative  / Bili: Negative / Urobili: Negative   Blood: x / Protein: 25 mg/dL / Nitrite: Negative   Leuk Esterase: Negative / RBC: 0-2 /HPF / WBC 0-2   Sq Epi: x / Non Sq Epi: Occasional / Bacteria: Negative      Amylase Serum--      Lipase [id number]       Ammonia--    Imaging:    < from: CT Abdomen and Pelvis w/ Oral Cont and w/ IV Cont (19 @ 00:38) >    EXAM:  CT ABDOMEN AND PELVIS OC IC                            PROCEDURE DATE:  2019          INTERPRETATION:  CLINICAL INFORMATION: Right-sided abdominal pain    COMPARISON: None.    PROCEDURE:   CT of the Abdomen and Pelvis was performed with intravenous contrast.   Intravenous contrast: 90 ml Omnipaque 350. 10 ml discarded.  Oral contrast: positive contrast was administered.  Sagittal and coronal reformats were performed.    FINDINGS:    LOWER CHEST: Multiple filling defects in the bilateral lower lobe lobar   and segmental pulmonary arteries consistent with pulmonary emboli. Mild   bibasilar atelectasis. Trace right pleural effusion.    LIVER: Within normal limits.  BILE DUCTS: Normal caliber.  GALLBLADDER: Within normal limits.  SPLEEN: Within normal limits.  PANCREAS: Within normal limits.  ADRENALS: Within normal limits.  KIDNEYS/URETERS: Enhance symmetrically. No hydronephrosis. Mild   nonspecific bilateral perinephric stranding. Retroaortic left renal vein.    BLADDER: Within normal limits.  REPRODUCTIVE ORGANS: Mildly enlarged prostate. There is a nonspecific 1.0   cm calcification in the penis dorsally.    BOWEL: No bowel obstruction. Appendix is unremarkable. Mild wall   thickening versus underdistention of the sigmoid colon; correlate   clinically for colitis and consider colonoscopy correlation.  PERITONEUM: No ascites.  VESSELS: Normal caliber abdominal aorta.  RETROPERITONEUM: No lymphadenopathy.    ABDOMINAL WALL: Tiny fat-containing left inguinal hernia.  BONES: Mild degenerative changes in the spine.    IMPRESSION:     Bilateral lower lobe lobar and segmental pulmonary emboli.    Mild wall thickening versus underdistention of the sigmoid colon;   correlate clinically for colitis and consider colonoscopy correlation.    Findings were discussed with Dr. Corey on 2019 12:53 AM with   readback.                MYAH MCKEON MD., ATTENDING RADIOLOGIST  This document has been electronically signed. 2019 12:55AM                < end of copied text >

## 2019-07-26 NOTE — H&P ADULT - RS GEN PE MLT RESP DETAILS PC
breath sounds equal/good air movement/diminished breath sounds, R/airway patent/respirations non-labored/diminished breath sounds, L

## 2019-07-26 NOTE — H&P ADULT - NEGATIVE OPHTHALMOLOGIC SYMPTOMS
no photophobia/no diplopia/no blurred vision R/no lacrimation L/no lacrimation R/no blurred vision L

## 2019-07-26 NOTE — PHYSICAL THERAPY INITIAL EVALUATION ADULT - PERTINENT HX OF CURRENT PROBLEM, REHAB EVAL
48 y/o M presents with 4 days of R flank pain, exacerbated by movement and taking a deep breath, without radiation. Patient was diagnosed with bilateral pulmonary embolism. Leg doppler was negative for DVT.

## 2019-07-26 NOTE — CONSULT NOTE ADULT - PROBLEM SELECTOR RECOMMENDATION 2
b/l pe on ct a/p  no dvt on us  echo and vq scan pending  ac per cards/pulm  consider heme eval  further w/u per primary

## 2019-07-26 NOTE — H&P ADULT - NEGATIVE CARDIOVASCULAR SYMPTOMS
no paroxysmal nocturnal dyspnea/no chest pain/no claudication/no palpitations/no orthopnea/no peripheral edema/no dyspnea on exertion

## 2019-07-26 NOTE — H&P ADULT - PROBLEM SELECTOR PLAN 1
on heparin drip ,pulm and card eval called , check BNP ,TTE , continue O2 supply , serial labs , PTT,INR , OAC plan as per Dr Berumen on heparin drip ,pulm and card eval called , check BNP ,TTE , continue O2 supply , serial labs , PTT,INR , OAC plan as per Dr Vallejo and Dr Maldonado

## 2019-07-26 NOTE — H&P ADULT - PROBLEM SELECTOR PLAN 2
BP monitoring , card cons called Admitted  to telemetry unit for monitoring , send 3 sets of cardiac ensymes to rule out coronary event, obtain ECHO to evaluate LVEF, cardiology consult ,continue current management, O2 supply, anticoagulation plan as per cardiology consult

## 2019-07-27 LAB
ANION GAP SERPL CALC-SCNC: 7 MMOL/L — SIGNIFICANT CHANGE UP (ref 5–17)
APTT BLD: 34.6 SEC — SIGNIFICANT CHANGE UP (ref 28.5–37)
BUN SERPL-MCNC: 11 MG/DL — SIGNIFICANT CHANGE UP (ref 7–23)
CALCIUM SERPL-MCNC: 8.4 MG/DL — LOW (ref 8.5–10.1)
CHLORIDE SERPL-SCNC: 108 MMOL/L — SIGNIFICANT CHANGE UP (ref 96–108)
CO2 SERPL-SCNC: 25 MMOL/L — SIGNIFICANT CHANGE UP (ref 22–31)
CREAT SERPL-MCNC: 0.82 MG/DL — SIGNIFICANT CHANGE UP (ref 0.5–1.3)
GLUCOSE SERPL-MCNC: 116 MG/DL — HIGH (ref 70–99)
HCT VFR BLD CALC: 34.4 % — LOW (ref 39–50)
HGB BLD-MCNC: 11.1 G/DL — LOW (ref 13–17)
INR BLD: 1.47 RATIO — HIGH (ref 0.88–1.16)
MCHC RBC-ENTMCNC: 28 PG — SIGNIFICANT CHANGE UP (ref 27–34)
MCHC RBC-ENTMCNC: 32.3 GM/DL — SIGNIFICANT CHANGE UP (ref 32–36)
MCV RBC AUTO: 86.9 FL — SIGNIFICANT CHANGE UP (ref 80–100)
NRBC # BLD: 0 /100 WBCS — SIGNIFICANT CHANGE UP (ref 0–0)
PLATELET # BLD AUTO: 410 K/UL — HIGH (ref 150–400)
POTASSIUM SERPL-MCNC: 3.7 MMOL/L — SIGNIFICANT CHANGE UP (ref 3.5–5.3)
POTASSIUM SERPL-SCNC: 3.7 MMOL/L — SIGNIFICANT CHANGE UP (ref 3.5–5.3)
PROTHROM AB SERPL-ACNC: 16.8 SEC — HIGH (ref 10–12.9)
RBC # BLD: 3.96 M/UL — LOW (ref 4.2–5.8)
RBC # FLD: 13.2 % — SIGNIFICANT CHANGE UP (ref 10.3–14.5)
SODIUM SERPL-SCNC: 140 MMOL/L — SIGNIFICANT CHANGE UP (ref 135–145)
WBC # BLD: 12.58 K/UL — HIGH (ref 3.8–10.5)
WBC # FLD AUTO: 12.58 K/UL — HIGH (ref 3.8–10.5)

## 2019-07-27 RX ADMIN — Medication 1 TABLET(S): at 22:07

## 2019-07-27 RX ADMIN — RIVAROXABAN 15 MILLIGRAM(S): KIT at 17:57

## 2019-07-27 RX ADMIN — Medication 1 TABLET(S): at 06:48

## 2019-07-27 RX ADMIN — PANTOPRAZOLE SODIUM 40 MILLIGRAM(S): 20 TABLET, DELAYED RELEASE ORAL at 06:48

## 2019-07-27 RX ADMIN — LOSARTAN POTASSIUM 100 MILLIGRAM(S): 100 TABLET, FILM COATED ORAL at 06:47

## 2019-07-27 RX ADMIN — Medication 50 MICROGRAM(S): at 06:46

## 2019-07-27 RX ADMIN — RIVAROXABAN 15 MILLIGRAM(S): KIT at 08:47

## 2019-07-27 NOTE — CONSULT NOTE ADULT - ASSESSMENT
50yo male w pmhx of SUZY on CPAP, Asthmatic bronchitis, Colitis, Hypothyroidism, and Hypertension presented w r flank pain found to have b/l pe.
Pt with bilateral Pulmonary Embolism, possibly provoked by travel. Hx of Colitis increases risk of VTE by 3-4X.  No evidence DVT.  Will order V/Q, and Echo .  Will start Xarelto--likely will need lifelong.  Hx SUZY on cpap.
pt with known ulcerative colitis, controlled with current regimen , follows at [facility],  no bleeding since june 2019  recent travel   provoked event  6 hrs car ride at a stretch in a day  admitted with rt lower chest pain   found to have johnny pe, on heparin , switched to xarelto tolerating well  most likly pe ,from recent travel , ct abd no mass   because  of   underlying ulcerative colitis , increased risk of recurrence long term anticoagulation   due to young age will get hyper coagulation jeter  can be done as out pt

## 2019-07-27 NOTE — CONSULT NOTE ADULT - PROBLEM SELECTOR RECOMMENDATION 9
Xarelto  VQ  Echo
ct images reviewed, thickening of colon is mild and suspect chronic in nature  clinically does not have active flare   cont chronic home meds for uc (lialda/budesonide)  bacid tid  diet as tolerated  monitor exam/stool output  no plans for further inpt gi w/u  pt will need close op f/u w private gi [facility] [place] upon dc for repeat colonoscopy  will follow
on xarelto, tolerating well   will steffany for hypercougulation jeter as out pt

## 2019-07-27 NOTE — PROGRESS NOTE ADULT - PROBLEM SELECTOR PLAN 4
Case d/w GI team , ct images reviewed, thickening of colon is mild and suspect chronic in nature; clinically does not have active flare   cont chronic home meds for uc (lialda/budesonide)  bacid tid  diet as tolerated  monitor exam/stool output  no plans for further inpt gi w/u  pt will need close op f/u w private gi [facility] upon dc for repeat colonoscopy

## 2019-07-27 NOTE — CONSULT NOTE ADULT - SUBJECTIVE AND OBJECTIVE BOX
Patient is a 49y old  Male who presents with a chief complaint of sob and  R flank pain (27 Jul 2019 14:58)      HPI:  Patient is a 48 y/o male with PMHx of SUZY ON CPAP at home ,  colitis (on Lialda and Budesonide), HTN, and hypothyroidism who presents with a 4 days history of R flank pain ,exacerbated by movement and taking a deep breath, without radiation. Pain was intermittent until last night  when it became a constant and patient came to  to [facility] ED. Patient saw his PCP (Dr. Ferguson) for the pain Monday and said he did a CXR and EKG, both which were normal, and he did not prescribe any medication. Patient describes associated shortness of breath since last night ,denies cp palpitations dizziness  Denies ever having pain like this before, denies previous hx of kidney stones.  Denies fever, n/v/d, urinary symptoms, blood in the urine, blood in the stool. Admits recent traveling by plane to [facility] and frequent car trips to [place] and [place] Seen by cardiologist Dr FERGUSON and pulmonologist Dr Vallejo on admission Hemonc consult requested by PCP Dr Ferguson to advise on OAC .Patient was diagnosed with bilateral pulmonary embolism and started on heparin drip. Leg doppler was negative for DVT .ADMITTED TO MONITORED UNIT . (26 Jul 2019 06:23)       PAST MEDICAL & SURGICAL HISTORY:  SUZY on CPAP  Colitis  Hypothyroidism  Hypertension  No significant past surgical history      MEDICATIONS  (STANDING):  budesonide ER 9 milliGRAM(s) 9 milliGRAM(s) Oral every 48 hours  lactobacillus acidophilus 1 Tablet(s) Oral three times a day  levothyroxine 50 MICROGram(s) Oral daily  lialda  1.2 2 Tablet(s) 2 Tablet(s) Oral daily  losartan 100 milliGRAM(s) Oral daily  pantoprazole    Tablet 40 milliGRAM(s) Oral before breakfast  rivaroxaban 15 milliGRAM(s) Oral two times a day with meals      FAMILY HISTORY:      Allergies    No Known Allergies    Intolerances          REVIEW OF SYSTEMS:    Constitutional: No fever, weight loss or fatigue  Eyes: No eye pain, visual disturbances, or discharge  ENT:  No difficulty hearing, tinnitus, vertigo; No sinus or throat pain  Neck: No pain or stiffness  Breasts: No pain, masses or nipple discharge  Respiratory: No cough, wheezing, chills or hemoptysis  Cardiovascular: No chest pain, palpitations, shortness of breath, dizziness or leg swelling  Gastrointestinal: No abdominal or epigastric pain. No nausea, vomiting or hematemesis; No diarrhea or constipation. No melena or hematochezia.  Genitourinary: No dysuria, frequency, hematuria or incontinence  Rectal: No pain, hemorrhoids or incontinence  Neurological: No headaches, memory loss, loss of strength, numbness or tremors  Skin: No itching, burning, rashes or lesions   Lymph Nodes: No enlarged glands  Endocrine: No heat or cold intolerance; No hair loss  Musculoskeletal: No joint pain or swelling; No muscle, back or extremity pain  Psychiatric: No depression, anxiety, mood swings or difficulty sleeping  Heme/Lymph: No easy bruising or bleeding gums  Allergy and Immunologic: No hives or eczema    Vital Signs Last 24 Hrs  T(C): 36.9 (27 Jul 2019 15:43), Max: 37.1 (26 Jul 2019 20:23)  T(F): 98.4 (27 Jul 2019 15:43), Max: 98.7 (26 Jul 2019 20:23)  HR: 93 (27 Jul 2019 15:43) (67 - 93)  BP: 122/79 (27 Jul 2019 15:43) (110/71 - 135/86)  BP(mean): --  RR: 18 (27 Jul 2019 15:43) (18 - 18)  SpO2: 98% (27 Jul 2019 15:43) (97% - 98%)    PHYSICAL EXAM:    Constitutional: NAD, well-groomed, well-developed  HEENT: PERRLA, EOMI, Normal Hearing, MMM  Neck: No LAD, No JVD  Back: Normal spine flexure, No CVA tenderness  Respiratory: Lungs Clear. No rales, wheezing, rhonchi  Cardiovascular: S1 and S2, RRR, no Murmur/Gallop/Rub  Gastrointestinal: BS+, soft, NT/ND  Extremities: No peripheral edema  Vascular: 2+ peripheral pulses  Neurological: A/O x 3, no focal deficits  Skin: No rashes    LABS:    CBC Full  -  ( 27 Jul 2019 07:39 )  WBC Count : 12.58 K/uL  RBC Count : 3.96 M/uL  Hemoglobin : 11.1 g/dL  Hematocrit : 34.4 %  Platelet Count - Automated : 410 K/uL  Mean Cell Volume : 86.9 fl  Mean Cell Hemoglobin : 28.0 pg  Mean Cell Hemoglobin Concentration : 32.3 gm/dL  Auto Neutrophil # : x  Auto Lymphocyte # : x  Auto Monocyte # : x  Auto Eosinophil # : x  Auto Basophil # : x  Auto Neutrophil % : x  Auto Lymphocyte % : x  Auto Monocyte % : x  Auto Eosinophil % : x  Auto Basophil % : x    07-27    140  |  108  |  11  ----------------------------<  116<H>  3.7   |  25  |  0.82    Ca    8.4<L>      27 Jul 2019 07:39  Phos  2.9     07-26  Mg     1.8     07-26    TPro  7.5  /  Alb  3.1<L>  /  TBili  0.9  /  DBili  x   /  AST  19  /  ALT  37  /  AlkPhos  84  07-26    PT/INR - ( 27 Jul 2019 07:39 )   PT: 16.8 sec;   INR: 1.47 ratio         PTT - ( 27 Jul 2019 07:39 )  PTT:34.6 sec    BLOOD SMEAR INTERPRETATION:    RADIOLOGY & ADDITIONAL STUDIES:  asessment and plan Patient is a 49y old  Male who presents with a chief complaint of sob and  R flank pain (27 Jul 2019 14:58)      HPI:  Patient is a 50 y/o male with PMHx of SUZY ON CPAP at home ,  colitis (on Lialda and Budesonide), HTN, and hypothyroidism who presents with a 4 days history of R flank pain ,exacerbated by movement and taking a deep breath, without radiation. Pain was intermittent until last night  when it became a constant and patient came to  to [facility] ED. Patient saw his PCP (Dr. Ferguson) for the pain Monday and said he did a CXR and EKG, both which were normal, and he did not prescribe any medication. Patient describes associated shortness of breath since last night ,denies cp palpitations dizziness  Denies ever having pain like this before, denies previous hx of kidney stones.  Denies fever, n/v/d, urinary symptoms, blood in the urine, blood in the stool. Admits recent traveling by plane to [facility] and frequent car trips to [place] and [place] Seen by cardiologist Dr FERGUSON and pulmonologist Dr Vallejo on admission Hemonc consult requested by PCP Dr Ferguson to advise on OAC .Patient was diagnosed with bilateral pulmonary embolism and started on heparin drip. Leg doppler was negative for DVT .ADMITTED TO MONITORED UNIT . (26 Jul 2019 06:23)   no fh of hypercoagulable state    PAST MEDICAL & SURGICAL HISTORY:  SUZY on CPAP  Colitis  Hypothyroidism  Hypertension  No significant past surgical history      MEDICATIONS  (STANDING):  budesonide ER 9 milliGRAM(s) 9 milliGRAM(s) Oral every 48 hours  lactobacillus acidophilus 1 Tablet(s) Oral three times a day  levothyroxine 50 MICROGram(s) Oral daily  lialda  1.2 2 Tablet(s) 2 Tablet(s) Oral daily  losartan 100 milliGRAM(s) Oral daily  pantoprazole    Tablet 40 milliGRAM(s) Oral before breakfast  rivaroxaban 15 milliGRAM(s) Oral two times a day with meals      FAMILY HISTORY:    neg for hypercoagulable state  Allergies    No Known Allergies    Intolerances          REVIEW OF SYSTEMS:    Constitutional: No fever, weight loss or fatigue  Eyes: No eye pain, visual disturbances, or discharge  ENT:  No difficulty hearing, tinnitus, vertigo; No sinus or throat pain  Neck: No pain or stiffness  Breasts: No pain, masses or nipple discharge  Respiratory: No cough, wheezing, chills or hemoptysis  Cardiovascular: No chest pain, palpitations, shortness of breath, dizziness or leg swelling  Gastrointestinal: No abdominal or epigastric pain. No nausea, vomiting or hematemesis; No diarrhea or constipation. No melena or hematochezia.  Genitourinary: No dysuria, frequency, hematuria or incontinence  Rectal: No pain, hemorrhoids or incontinence  Neurological: No headaches, memory loss, loss of strength, numbness or tremors  Skin: No itching, burning, rashes or lesions   Lymph Nodes: No enlarged glands  Endocrine: No heat or cold intolerance; No hair loss  Musculoskeletal: No joint pain or swelling; No muscle, back or extremity pain  Psychiatric: No depression, anxiety, mood swings or difficulty sleeping  Heme/Lymph: No easy bruising or bleeding gums  Allergy and Immunologic: No hives or eczema    Vital Signs Last 24 Hrs  T(C): 36.9 (27 Jul 2019 15:43), Max: 37.1 (26 Jul 2019 20:23)  T(F): 98.4 (27 Jul 2019 15:43), Max: 98.7 (26 Jul 2019 20:23)  HR: 93 (27 Jul 2019 15:43) (67 - 93)  BP: 122/79 (27 Jul 2019 15:43) (110/71 - 135/86)  BP(mean): --  RR: 18 (27 Jul 2019 15:43) (18 - 18)  SpO2: 98% (27 Jul 2019 15:43) (97% - 98%)    PHYSICAL EXAM:    Constitutional: NAD, well-groomed, well-developed  HEENT: PERRLA, EOMI, Normal Hearing, MMM  Neck: No LAD, No JVD  Back: Normal spine flexure, No CVA tenderness  Respiratory: Lungs Clear. No rales, wheezing, rhonchi  Cardiovascular: S1 and S2, RRR, no Murmur/Gallop/Rub  Gastrointestinal: BS+, soft, NT/ND  Extremities: No peripheral edema  Vascular: 2+ peripheral pulses  Neurological: A/O x 3, no focal deficits  Skin: No rashes    LABS:    CBC Full  -  ( 27 Jul 2019 07:39 )  WBC Count : 12.58 K/uL  RBC Count : 3.96 M/uL  Hemoglobin : 11.1 g/dL  Hematocrit : 34.4 %  Platelet Count - Automated : 410 K/uL  Mean Cell Volume : 86.9 fl  Mean Cell Hemoglobin : 28.0 pg  Mean Cell Hemoglobin Concentration : 32.3 gm/dL  Auto Neutrophil # : x  Auto Lymphocyte # : x  Auto Monocyte # : x  Auto Eosinophil # : x  Auto Basophil # : x  Auto Neutrophil % : x  Auto Lymphocyte % : x  Auto Monocyte % : x  Auto Eosinophil % : x  Auto Basophil % : x    07-27    140  |  108  |  11  ----------------------------<  116<H>  3.7   |  25  |  0.82    Ca    8.4<L>      27 Jul 2019 07:39  Phos  2.9     07-26  Mg     1.8     07-26    TPro  7.5  /  Alb  3.1<L>  /  TBili  0.9  /  DBili  x   /  AST  19  /  ALT  37  /  AlkPhos  84  07-26    PT/INR - ( 27 Jul 2019 07:39 )   PT: 16.8 sec;   INR: 1.47 ratio         PTT - ( 27 Jul 2019 07:39 )  PTT:34.6 sec    BLOOD SMEAR INTERPRETATION:    RADIOLOGY & ADDITIONAL STUDIES:  asessment and plan

## 2019-07-28 ENCOUNTER — TRANSCRIPTION ENCOUNTER (OUTPATIENT)
Age: 49
End: 2019-07-28

## 2019-07-28 VITALS
RESPIRATION RATE: 18 BRPM | DIASTOLIC BLOOD PRESSURE: 88 MMHG | SYSTOLIC BLOOD PRESSURE: 133 MMHG | TEMPERATURE: 98 F | HEART RATE: 73 BPM | OXYGEN SATURATION: 96 %

## 2019-07-28 LAB
ANION GAP SERPL CALC-SCNC: 8 MMOL/L — SIGNIFICANT CHANGE UP (ref 5–17)
BUN SERPL-MCNC: 11 MG/DL — SIGNIFICANT CHANGE UP (ref 7–23)
CALCIUM SERPL-MCNC: 9.1 MG/DL — SIGNIFICANT CHANGE UP (ref 8.5–10.1)
CHLORIDE SERPL-SCNC: 107 MMOL/L — SIGNIFICANT CHANGE UP (ref 96–108)
CO2 SERPL-SCNC: 26 MMOL/L — SIGNIFICANT CHANGE UP (ref 22–31)
CREAT SERPL-MCNC: 0.87 MG/DL — SIGNIFICANT CHANGE UP (ref 0.5–1.3)
GLUCOSE SERPL-MCNC: 107 MG/DL — HIGH (ref 70–99)
HCT VFR BLD CALC: 34.3 % — LOW (ref 39–50)
HGB BLD-MCNC: 11 G/DL — LOW (ref 13–17)
MCHC RBC-ENTMCNC: 27.8 PG — SIGNIFICANT CHANGE UP (ref 27–34)
MCHC RBC-ENTMCNC: 32.1 GM/DL — SIGNIFICANT CHANGE UP (ref 32–36)
MCV RBC AUTO: 86.6 FL — SIGNIFICANT CHANGE UP (ref 80–100)
NRBC # BLD: 0 /100 WBCS — SIGNIFICANT CHANGE UP (ref 0–0)
PLATELET # BLD AUTO: 460 K/UL — HIGH (ref 150–400)
POTASSIUM SERPL-MCNC: 3.8 MMOL/L — SIGNIFICANT CHANGE UP (ref 3.5–5.3)
POTASSIUM SERPL-SCNC: 3.8 MMOL/L — SIGNIFICANT CHANGE UP (ref 3.5–5.3)
RBC # BLD: 3.96 M/UL — LOW (ref 4.2–5.8)
RBC # FLD: 13.1 % — SIGNIFICANT CHANGE UP (ref 10.3–14.5)
SODIUM SERPL-SCNC: 141 MMOL/L — SIGNIFICANT CHANGE UP (ref 135–145)
WBC # BLD: 10.8 K/UL — HIGH (ref 3.8–10.5)
WBC # FLD AUTO: 10.8 K/UL — HIGH (ref 3.8–10.5)

## 2019-07-28 PROCEDURE — 84100 ASSAY OF PHOSPHORUS: CPT

## 2019-07-28 PROCEDURE — 85610 PROTHROMBIN TIME: CPT

## 2019-07-28 PROCEDURE — 36415 COLL VENOUS BLD VENIPUNCTURE: CPT

## 2019-07-28 PROCEDURE — 85730 THROMBOPLASTIN TIME PARTIAL: CPT

## 2019-07-28 PROCEDURE — 93306 TTE W/DOPPLER COMPLETE: CPT

## 2019-07-28 PROCEDURE — 78582 LUNG VENTILAT&PERFUS IMAGING: CPT

## 2019-07-28 PROCEDURE — 84484 ASSAY OF TROPONIN QUANT: CPT

## 2019-07-28 PROCEDURE — 93005 ELECTROCARDIOGRAM TRACING: CPT

## 2019-07-28 PROCEDURE — 80048 BASIC METABOLIC PNL TOTAL CA: CPT

## 2019-07-28 PROCEDURE — 85379 FIBRIN DEGRADATION QUANT: CPT

## 2019-07-28 PROCEDURE — 84436 ASSAY OF TOTAL THYROXINE: CPT

## 2019-07-28 PROCEDURE — 83735 ASSAY OF MAGNESIUM: CPT

## 2019-07-28 PROCEDURE — 80053 COMPREHEN METABOLIC PANEL: CPT

## 2019-07-28 PROCEDURE — 81001 URINALYSIS AUTO W/SCOPE: CPT

## 2019-07-28 PROCEDURE — 97162 PT EVAL MOD COMPLEX 30 MIN: CPT

## 2019-07-28 PROCEDURE — 93970 EXTREMITY STUDY: CPT

## 2019-07-28 PROCEDURE — 96365 THER/PROPH/DIAG IV INF INIT: CPT | Mod: XU

## 2019-07-28 PROCEDURE — 99285 EMERGENCY DEPT VISIT HI MDM: CPT | Mod: 25

## 2019-07-28 PROCEDURE — 87086 URINE CULTURE/COLONY COUNT: CPT

## 2019-07-28 PROCEDURE — 74177 CT ABD & PELVIS W/CONTRAST: CPT

## 2019-07-28 PROCEDURE — 84443 ASSAY THYROID STIM HORMONE: CPT

## 2019-07-28 PROCEDURE — 83880 ASSAY OF NATRIURETIC PEPTIDE: CPT

## 2019-07-28 PROCEDURE — 83690 ASSAY OF LIPASE: CPT

## 2019-07-28 PROCEDURE — 96375 TX/PRO/DX INJ NEW DRUG ADDON: CPT

## 2019-07-28 PROCEDURE — 85027 COMPLETE CBC AUTOMATED: CPT

## 2019-07-28 PROCEDURE — 71045 X-RAY EXAM CHEST 1 VIEW: CPT

## 2019-07-28 RX ORDER — OMEPRAZOLE 10 MG/1
1 CAPSULE, DELAYED RELEASE ORAL
Qty: 0 | Refills: 0 | DISCHARGE

## 2019-07-28 RX ORDER — RIVAROXABAN 15 MG-20MG
1 KIT ORAL
Qty: 40 | Refills: 0
Start: 2019-07-28 | End: 2019-08-16

## 2019-07-28 RX ORDER — ACETAMINOPHEN 500 MG
2 TABLET ORAL
Qty: 0 | Refills: 0 | DISCHARGE
Start: 2019-07-28

## 2019-07-28 RX ADMIN — Medication 1 TABLET(S): at 05:32

## 2019-07-28 RX ADMIN — Medication 50 MICROGRAM(S): at 05:32

## 2019-07-28 RX ADMIN — PANTOPRAZOLE SODIUM 40 MILLIGRAM(S): 20 TABLET, DELAYED RELEASE ORAL at 05:32

## 2019-07-28 RX ADMIN — RIVAROXABAN 15 MILLIGRAM(S): KIT at 08:26

## 2019-07-28 RX ADMIN — LOSARTAN POTASSIUM 100 MILLIGRAM(S): 100 TABLET, FILM COATED ORAL at 05:32

## 2019-07-28 NOTE — PROGRESS NOTE ADULT - PROBLEM SELECTOR PLAN 1
ct images reviewed, thickening of colon is mild and suspect chronic in nature; clinically does not have active flare   cont chronic home meds for uc (lialda/budesonide)  bacid tid  diet as tolerated  monitor exam/stool output  no plans for further inpt gi w/u  pt will need close op f/u w private gi [place] upon dc for repeat colonoscopy  above dw pt/wife at length, questions answered/agreeable, requesting gi team contact dr yoly marsh (gi) at [place] [phone] on monday to provide update
bilateral Pulmonary Embolism, possibly provoked by travel. started on xarelto
bilateral Pulmonary Embolism, possibly provoked by travel. started on xarelto
ct images reviewed, thickening of colon is mild and suspect chronic in nature; clinically does not have active flare   cont chronic home meds for uc (lialda/budesonide)  cont bacid tid  diet as tolerated  no plans for further inpt gi w/u  pt will need close op f/u w private gi [facility] upon dc for repeat colonoscopy  above re-dw pt/wife, questions answered/agreeable, requesting gi team contact dr yoly marsh (gi) at [place] [phone] on monday to provide update
xarelto  See me in office next week.
xarelto  echo

## 2019-07-28 NOTE — PROGRESS NOTE ADULT - ASSESSMENT
48yo male w pmhx of SUZY on CPAP, Asthmatic bronchitis, Colitis, Hypothyroidism, and Hypertension presented w r flank pain found to have b/l pe.
pt with pulmonary emboli -on heparin  can start shell  r/o coaugulopathy -hematology evaluation  hypertenson  leoncio- on cpap  hypothyroidism  echo ordered
48yo male w pmhx of SUZY on CPAP, Asthmatic bronchitis, Colitis, Hypothyroidism, and Hypertension presented w r flank pain found to have b/l pe.
Patient is a 48 y/o male with PMHx of SUZY ON CPAP at home ,  colitis (on Lialda and Budesonide), HTN, and hypothyroidism who presents with a 4 days history of R flank pain ,exacerbated by movement and taking a deep breath, without radiation. Pain was intermittent until last night  when it became a constant and patient came to  to [facility] ED. Patient saw his PCP (Dr. Ferguson) for the pain Monday and said he did a CXR and EKG, both which were normal, and he did not prescribe any medication. Patient describes associated shortness of breath since last night ,denies cp palpitations dizziness  Denies ever having pain like this before, denies previous hx of kidney stones.  Denies fever, n/v/d, urinary symptoms, blood in the urine, blood in the stool. Admits recent traveling by plane to [facility] and frequent car trips to [place] and [place] Seen by cardiologist Dr FERGUSON and pulmonologist Dr Vallejo on admission Hemonc consult requested by PCP Dr Ferguson to advise on OAC .Patient was diagnosed with bilateral pulmonary embolism and started on heparin drip .Leg doppler was negative for DVT .ADMITTED TO MONITORED UNIT .
Patient is a 50 y/o male with PMHx of SUZY ON CPAP at home ,  colitis (on Lialda and Budesonide), HTN, and hypothyroidism who presents with a 4 days history of R flank pain ,exacerbated by movement and taking a deep breath, without radiation. Pain was intermittent until last night  when it became a constant and patient came to  to [facility] ED. Patient saw his PCP (Dr. Ferguson) for the pain Monday and said he did a CXR and EKG, both which were normal, and he did not prescribe any medication. Patient describes associated shortness of breath since last night ,denies cp palpitations dizziness  Denies ever having pain like this before, denies previous hx of kidney stones.  Denies fever, n/v/d, urinary symptoms, blood in the urine, blood in the stool. Admits recent traveling by plane to [facility] and frequent car trips to [place] and [place] Seen by cardiologist Dr FERGUSON and pulmonologist Dr Vallejo on admission Hemonc consult requested by PCP Dr Ferguson to advise on OAC .Patient was diagnosed with bilateral pulmonary embolism and started on heparin drip .Leg doppler was negative for DVT .ADMITTED TO MONITORED UNIT .
Pt with bilateral Pulmonary Embolism, possibly provoked by travel. Hx of Colitis increases risk of VTE by 3-4X.  Can discharge home on Xarelto, likely life long.  Echo ok.  No evidence DVT.  Advised see me in office next week.  Advised of bleeding risk from Xarelto  Hx SUZY on cpap.
Pt with bilateral Pulmonary Embolism, possibly provoked by travel. Hx of Colitis increases risk of VTE by 3-4X.  Can discharge home on Xarelto, likely life long.  Will obtain echo  No evidence DVT.  Advised see me in office next week.  Advised of bleeding risk from Xarelto  Hx SUZY on cpap.
pt with acute pe   lung scan - bilateral pulmonary emboli  on xarleto  troponin x3 neg  hypertension  hypothyroidism  colitis
pt with acute pe   lung scan - bilateral pulmonary emboli  on xarleto  troponin x3 neg  hypertension  hypothyroidism  colitis  cardiac status stable for discharge 7/28

## 2019-07-28 NOTE — DISCHARGE NOTE PROVIDER - PROVIDER TOKENS
PROVIDER:[TOKEN:[[id number]],FOLLOWUP:[1 week]],PROVIDER:[TOKEN:[[id number]],FOLLOWUP:[1 week]]

## 2019-07-28 NOTE — DISCHARGE NOTE PROVIDER - NSDCCPCAREPLAN_GEN_ALL_CORE_FT
PRINCIPAL DISCHARGE DIAGNOSIS  Diagnosis: Pulmonary embolism  Assessment and Plan of Treatment:       SECONDARY DISCHARGE DIAGNOSES  Diagnosis: SUZY on CPAP  Assessment and Plan of Treatment: SUZY on CPAP    Diagnosis: Colitis  Assessment and Plan of Treatment: Colitis    Diagnosis: Hypertension  Assessment and Plan of Treatment: Hypertension

## 2019-07-28 NOTE — PROGRESS NOTE ADULT - REASON FOR ADMISSION
sob and flank pain on a left
sob and  R flank pain

## 2019-07-28 NOTE — DISCHARGE NOTE PROVIDER - HOSPITAL COURSE
Patient is a 50 y/o male with PMHx of SUZY ON CPAP at home ,  colitis (on Lialda and Budesonide), HTN, and hypothyroidism who presents with a 4 days history of R flank pain ,exacerbated by movement and taking a deep breath, without radiation. Pain was intermittent until last night  when it became a constant and patient came to  to [facility] ED. Patient saw his PCP (Dr. Ferguson) for the pain Monday and said he did a CXR and EKG, both which were normal, and he did not prescribe any medication. Patient describes associated shortness of breath since last night ,denies cp palpitations dizziness  Denies ever having pain like this before, denies previous hx of kidney stones.  Denies fever, n/v/d, urinary symptoms, blood in the urine, blood in the stool. Admits recent traveling by plane to [facility] and frequent car trips to [place] and [place] Seen by cardiologist Dr FERGUSON and pulmonologist Dr Vallejo on admission Hemonc consult requested by PCP Dr Ferguson to advise on OAC .Patient was diagnosed with bilateral pulmonary embolism and started on heparin drip .Leg doppler was negative for DVT .ADMITTED TO MONITORED UNIT .    ·  Problem: Pulmonary embolism.  Plan: bilateral Pulmonary Embolism, possibly provoked by travel. started on xarelto.     ·  Problem: Hypertension.  Plan: BP monitoring , card cons called Admitted  to telemetry unit for monitoring , send 3 sets of cardiac ensymes to rule out coronary event, obtain ECHO to evaluate LVEF, cardiology consult ,continue current management, O2 supply, anticoagulation plan as per cardiology consult.     ·  Problem: Hypothyroidism.  Plan: check TSH and continue home dose of synthroid.     ·  Problem: Colitis.  Plan: Case d/w GI team , ct images reviewed, thickening of colon is mild and suspect chronic in nature; clinically does not have active flare     cont chronic home meds for uc (lialda/budesonide)    bacid tid    diet as tolerated    monitor exam/stool output    no plans for further inpt gi w/u    pt will need close op f/u w private gi [facility] upon [place] for repeat colonoscopy.     ·  Problem: SUZY on CPAP.  Plan: CPAP as per pulmonology orders.     Problem: Prophylactic measure. Plan: Gastrointestinal stress ulcer prophylaxis and DVT prophylaxis administrated.

## 2019-07-28 NOTE — PROGRESS NOTE ADULT - PROVIDER SPECIALTY LIST ADULT
Cardiology
Cardiology
Critical Care
Gastroenterology
Hospitalist
Hospitalist
Pulmonology
Cardiology
Gastroenterology

## 2019-07-28 NOTE — PROGRESS NOTE ADULT - SUBJECTIVE AND OBJECTIVE BOX
CARDIOLOGY CONSULT NOTE    Patient is a 49y Male with a known history of :  Prophylactic measure (Z29.9)  Asthmatic bronchitis (J45.909)  Colitis (K52.9)  Hypothyroidism (E03.9)  Hypertension (I10)  Pulmonary embolism (I26.99)    HPI:      REVIEW OF SYSTEMS:    CONSTITUTIONAL: No fever, weight loss, or fatigue  EYES: No eye pain, visual disturbances, or discharge  ENMT:  No difficulty hearing, tinnitus, vertigo; No sinus or throat pain  NECK: No pain or stiffness  BREASTS: No pain, masses, or nipple discharge  RESPIRATORY: No cough, wheezing, chills or hemoptysis; No shortness of breath  CARDIOVASCULAR: No chest pain, palpitations, dizziness, or leg swelling  GASTROINTESTINAL: No abdominal or epigastric pain. No nausea, vomiting, or hematemesis; No diarrhea or constipation. No melena or hematochezia.  GENITOURINARY: No dysuria, frequency, hematuria, or incontinence  NEUROLOGICAL: No headaches, memory loss, loss of strength, numbness, or tremors  SKIN: No itching, burning, rashes, or lesions   LYMPH NODES: No enlarged glands  ENDOCRINE: No heat or cold intolerance; No hair loss  MUSCULOSKELETAL: No joint pain or swelling; No muscle, back, or extremity pain  PSYCHIATRIC: No depression, anxiety, mood swings, or difficulty sleeping  HEME/LYMPH: No easy bruising, or bleeding gums  ALLERGY AND IMMUNOLOGIC: No hives or eczema    MEDICATIONS  (STANDING):  heparin  Infusion.  Unit(s)/Hr (16 mL/Hr) IV Continuous <Continuous>  levothyroxine 50 MICROGram(s) Oral daily  losartan 100 milliGRAM(s) Oral daily  pantoprazole    Tablet 40 milliGRAM(s) Oral before breakfast    MEDICATIONS  (PRN):  acetaminophen   Tablet .. 650 milliGRAM(s) Oral every 6 hours PRN Temp greater or equal to 38C (100.4F), Mild Pain (1 - 3)  heparin  Injectable 7000 Unit(s) IV Push every 6 hours PRN For aPTT less than 40  heparin  Injectable 3500 Unit(s) IV Push every 6 hours PRN For aPTT between 40 - 57  ketorolac   Injectable 15 milliGRAM(s) IV Push four times a day PRN Moderate Pain (4 - 6)  ketorolac   Injectable 30 milliGRAM(s) IV Push four times a day PRN Severe Pain (7 - 10)      ALLERGIES: No Known Allergies      Social History: non-smoker ,non drinker     FAMILY HISTORY: mother  paf(atrial fib)      PAST MEDICAL & SURGICAL HISTORY:  Asthmatic bronchitis  Colitis  Hypothyroidism  Hypertension  No significant past surgical history        PHYSICAL EXAMINATION:  -----------------------------  T(C): 36.3 (07-26-19 @ 07:20), Max: 37.1 (07-25-19 @ 20:10)  HR: 80 (07-26-19 @ 07:20) (70 - 91)  BP: 133/88 (07-26-19 @ 07:20) (119/83 - 133/95)  RR: 16 (07-26-19 @ 07:20) (16 - 20)  SpO2: 97% (07-26-19 @ 07:20) (97% - 100%)  Wt(kg): --    07-25 @ 07:01  -  07-26 @ 07:00  --------------------------------------------------------  IN:    heparin  Infusion.: 32 mL    Sodium Chloride 0.9% IV Bolus: 2000 mL  Total IN: 2032 mL    OUT:  Total OUT: 0 mL    Total NET: 2032 mL        Height (cm): 182.88 (07-25 @ 20:10)  Weight (kg): 88.5 (07-25 @ 20:10)  BMI (kg/m2): 26.5 (07-25 @ 20:10)  BSA (m2): 2.11 (07-25 @ 20:10)    Constitutional: well developed, normal appearance, well groomed, well nourished, no deformities and no acute distress.   Eyes: the conjunctiva exhibited no abnormalities and the eyelids demonstrated no xanthelasmas.   HEENT: normal oral mucosa, no oral pallor and no oral cyanosis.   Neck: normal jugular venous A waves present, normal jugular venous V waves present and no jugular venous hernandez A waves.   Pulmonary: no respiratory distress, normal respiratory rhythm and effort, no accessory muscle use and lungs were clear to auscultation bilaterally.   Cardiovascular: heart rate and rhythm were normal, normal S1 and S2 and no murmur, gallop, rub, heave or thrill are present.   Abdomen: soft, non-tender, no hepato-splenomegaly and no abdominal mass palpated.   Musculoskeletal: the gait could not be assessed..   Extremities: no clubbing of the fingernails, no localized cyanosis, no petechial hemorrhages and no ischemic changes.   Skin: normal skin color and pigmentation, no rash, no venous stasis, no skin lesions, no skin ulcer and no xanthoma was observed.   Psychiatric: oriented to person, place, and time, the affect was normal, the mood was normal and not feeling anxious.     LABS:   --------  07-25    136  |  102  |  12  ----------------------------<  148<H>  4.0   |  26  |  1.10    Ca    9.7      25 Jul 2019 21:42    TPro  9.4<H>  /  Alb  3.9  /  TBili  0.7  /  DBili  x   /  AST  20  /  ALT  44  /  AlkPhos  106  07-25                         11.4   14.16 )-----------( 386      ( 26 Jul 2019 07:41 )             34.6       07-26 @ 01:11 BNP: 30 pg/mL    07-26 @ 01:11 CPK total:--, CKMB --, Troponin I - <.015 ng/mL          RADIOLOGY:  -----------------        ECG:     ECHO
INTERVAL HPI/OVERNIGHT EVENTS:  pt seen and examined, wife present  denies n/v/abd pain  had non bloody bm  tolerating po    MEDICATIONS  (STANDING):  budesonide ER 9 milliGRAM(s) 9 milliGRAM(s) Oral every 48 hours  lactobacillus acidophilus 1 Tablet(s) Oral three times a day  levothyroxine 50 MICROGram(s) Oral daily  lialda  1.2 2 Tablet(s) 2 Tablet(s) Oral daily  losartan 100 milliGRAM(s) Oral daily  pantoprazole    Tablet 40 milliGRAM(s) Oral before breakfast  rivaroxaban 15 milliGRAM(s) Oral two times a day with meals    MEDICATIONS  (PRN):  acetaminophen   Tablet .. 650 milliGRAM(s) Oral every 6 hours PRN Temp greater or equal to 38C (100.4F), Mild Pain (1 - 3)      Allergies    No Known Allergies    Intolerances        Review of Systems:    General:  No wt loss, fevers, chills, night sweats, fatigue   Eyes:  Good vision, no reported pain  ENT:  No sore throat, pain, runny nose, dysphagia  CV:  No pain, palpitations, hypo/hypertension  Resp:  No dyspnea, cough, tachypnea, wheezing  GI:  No pain, No nausea, No vomiting, No diarrhea, No constipation, No weight loss, No fever, No pruritis, No rectal bleeding, No melena, No dysphagia  :  No pain, bleeding, incontinence, nocturia  Muscle:  No pain, weakness  Neuro:  No weakness, tingling, memory problems  Psych:  No fatigue, insomnia, mood problems, depression  Endocrine:  No polyuria, polydypsia, cold/heat intolerance  Heme:  No petechiae, ecchymosis, easy bruisability  Skin:  No rash, tattoos, scars, edema      Vital Signs Last 24 Hrs  T(C): 36.8 (28 Jul 2019 07:22), Max: 36.9 (27 Jul 2019 15:43)  T(F): 98.2 (28 Jul 2019 07:22), Max: 98.5 (27 Jul 2019 19:24)  HR: 72 (28 Jul 2019 07:22) (71 - 93)  BP: 141/86 (28 Jul 2019 07:22) (119/84 - 141/86)  BP(mean): --  RR: 19 (28 Jul 2019 07:22) (18 - 19)  SpO2: 97% (28 Jul 2019 07:22) (97% - 98%)    PHYSICAL EXAM:    General:  nad  HEENT:  NC/AT  Chest: dec bs  Cardiovascular:  Regular rhythm, S1, S2  Abdomen:  soft nt nd  Extremities:  no edema  Skin:  No rash  Neuro/Psych:  A&O x3      LABS:                        11.0   10.80 )-----------( 460      ( 28 Jul 2019 07:21 )             34.3     07-28    141  |  107  |  11  ----------------------------<  107<H>  3.8   |  26  |  0.87    Ca    9.1      28 Jul 2019 07:21      PT/INR - ( 27 Jul 2019 07:39 )   PT: 16.8 sec;   INR: 1.47 ratio         PTT - ( 27 Jul 2019 07:39 )  PTT:34.6 sec      RADIOLOGY & ADDITIONAL TESTS:
PROGRESS NOTE  Patient is a 49y old  Male who presents with a chief complaint of sob and  R flank pain (2019 09:44)  Chart and available morning labs /imaging are reviewed electronically , urgent issues addressed . More information  is being added upon completion of rounds , when more information is collected and management discussed with consultants , medical staff and social service/case management on the floor   OVERNIGHT  Feels better today denies cp and sob   No new issues reported by medical staff . All above noted Patient is resting in a bed comfortably .No distress noted   HPI:  Patient is a 48 y/o male with PMHx of SUZY ON CPAP at home ,  colitis (on Lialda and Budesonide), HTN, and hypothyroidism who presents with a 4 days history of R flank pain ,exacerbated by movement and taking a deep breath, without radiation. Pain was intermittent until last night  when it became a constant and patient came to  to hospitals ED. Patient saw his PCP (Dr. Ferguson) for the pain Monday and said he did a CXR and EKG, both which were normal, and he did not prescribe any medication. Patient describes associated shortness of breath since last night ,denies cp palpitations dizziness  Denies ever having pain like this before, denies previous hx of kidney stones.  Denies fever, n/v/d, urinary symptoms, blood in the urine, blood in the stool. Admits recent traveling by plane to [facility] and frequent car trips to [place] and [place] Seen by cardiologist Dr FERGUSON and pulmonologist Dr Vallejo on admission Hemonc consult requested by PCP Dr Ferguson to advise on OAC .Patient was diagnosed with bilateral pulmonary embolism and started on heparin drip. Leg doppler was negative for DVT .ADMITTED TO MONITORED UNIT . (2019 06:23)    PAST MEDICAL & SURGICAL HISTORY:  SUZY on CPAP  Colitis  Hypothyroidism  Hypertension  No significant past surgical history      MEDICATIONS  (STANDING):  budesonide ER 9 milliGRAM(s) 9 milliGRAM(s) Oral every 48 hours  lactobacillus acidophilus 1 Tablet(s) Oral three times a day  levothyroxine 50 MICROGram(s) Oral daily  lialda  1.2 2 Tablet(s) 2 Tablet(s) Oral daily  losartan 100 milliGRAM(s) Oral daily  pantoprazole    Tablet 40 milliGRAM(s) Oral before breakfast  rivaroxaban 15 milliGRAM(s) Oral two times a day with meals    MEDICATIONS  (PRN):  acetaminophen   Tablet .. 650 milliGRAM(s) Oral every 6 hours PRN Temp greater or equal to 38C (100.4F), Mild Pain (1 - 3)      OBJECTIVE    T(C): 36.7 (19 @ 10:57), Max: 37.1 (19 @ 20:23)  HR: 76 (19 @ 10:57) (67 - 81)  BP: 121/76 (19 @ 10:57) (110/71 - 135/86)  RR: 18 (19 @ 10:57) (18 - 18)  SpO2: 97% (19 @ 10:57) (97% - 98%)  Wt(kg): --  I&O's Summary    2019 07:01  -  2019 07:00  --------------------------------------------------------  IN: 16 mL / OUT: 0 mL / NET: 16 mL          REVIEW OF SYSTEMS:  CONSTITUTIONAL: No fever, weight loss, or fatigue  EYES: No eye pain, visual disturbances, or discharge  ENMT:   No sinus or throat pain  NECK: No pain or stiffness  RESPIRATORY: No cough, wheezing, chills or hemoptysis; No shortness of breath  CARDIOVASCULAR: No chest pain, palpitations, dizziness, or leg swelling  GASTROINTESTINAL: No abdominal pain. No nausea, vomiting; No diarrhea or constipation. No melena or hematochezia.  GENITOURINARY: No dysuria, frequency, hematuria, or incontinence  NEUROLOGICAL: No headaches, memory loss, loss of strength, numbness, or tremors  SKIN: No itching, burning, rashes, or lesions   MUSCULOSKELETAL: No joint pain or swelling; No muscle, back, or extremity pain    PHYSICAL EXAM:  Appearance: NAD. VS past 24 hrs -as above   HEENT:   Moist oral mucosa. Conjunctiva clear b/l.   Neck : supple  Respiratory: Lungs CTAB.  Gastrointestinal:  Soft, nontender. No rebound. No rigidity. BS present	  Cardiovascular: RRR ,S1S2 present  Neurologic: Non-focal. Moving all extremities.  Extremities: No edema. No erythema. No calf tenderness.  Skin: No rashes, No ecchymoses, No cyanosis.	  wounds ,skin lesions-See skin assesment flow sheet   LABS:                        11.1   12.58 )-----------( 410      ( 2019 07:39 )             34.4         140  |  108  |  11  ----------------------------<  116<H>  3.7   |  25  |  0.82    Ca    8.4<L>      2019 07:39  Phos  2.9       Mg     1.8         TPro  7.5  /  Alb  3.1<L>  /  TBili  0.9  /  DBili  x   /  AST  19  /  ALT  37  /  AlkPhos  84      CAPILLARY BLOOD GLUCOSE        PT/INR - ( 2019 07:39 )   PT: 16.8 sec;   INR: 1.47 ratio         PTT - ( 2019 07:39 )  PTT:34.6 sec  Urinalysis Basic - ( 2019 21:42 )    Color: Yellow / Appearance: Slightly Turbid / S.015 / pH: x  Gluc: x / Ketone: Negative  / Bili: Negative / Urobili: Negative   Blood: x / Protein: 25 mg/dL / Nitrite: Negative   Leuk Esterase: Negative / RBC: 0-2 /HPF / WBC 0-2   Sq Epi: x / Non Sq Epi: Occasional / Bacteria: Negative        RADIOLOGY & ADDITIONAL TESTS:< from: Xray Chest 1 View AP/PA (19 @ 14:57) >  EXAM:  XR CHEST AP OR PA 1V                            PROCEDURE DATE:  2019          INTERPRETATION:    DATE OF STUDY: 2019.    PRIOR: 11 plain chest; had 19 CT scan of abdomen and pelvis   with IV contrast.    CLINICAL INDICATION: 49-yo-male patient - workup for pulmonary embolism -   chest film prior to V/Q scan.    TECHNIQUE: portable chest.    FINDINGS:   The cardiomediastinal silhouette is within normal limits.  Small pleural effusions with associated bibasilar atelectases.  The lungs are otherwise clear.  No pneumothorax.  No acute bony findings.    IMPRESSION:   No bilateral pleural effusions with associated bibasilar atelectases.    < end of copied text >  < from: NM Pulmonary Ventilation/Perfusion Scan (19 @ 14:48) >  EXAM:  NM PULM VENTILATION PERFUS IMG                            PROCEDURE DATE:  2019          INTERPRETATION:  CLINICAL INFORMATION: 49 year-old male with shortness of   breath, bilateral pulmonary emboli in the lower lobes on recent CT,   referred for further evaluation.    RADIOPHARMACEUTICAL: 1 mCi Tc-99m-DTPA by inhalation; 6.0 mCi Tc-99m-MAA,   I.V.    TECHNIQUE:  Ventilation and perfusion images of the lungs were obtained   following administration of Tc-99m-DTPA and Tc-99m-MAA. Images were   obtained in the anterior, posterior, both lateral, and all 4 oblique   projections. This study was interpreted in conjunction with a chest   radiograph of 2019    COMPARISON: No previous lung ventilation/perfusion scan for comparison    FINDINGS: There are segmental mismatched ventilation/perfusion defects in   the apical and posterior segments of the right upper lobe, right middle   lobe, lateral basal segment of the right lower lobe and in the anterior   and lateral basal segments of the left lower lobe.There is heterogeneous   radiotracer distribution in the lungs on both the ventilation and the   perfusion images.     IMPRESSION: Abnormal ventilation/perfusion lung scan. High probability of   pulmonary embolus.    < end of copied text >     reviewed elctronic 	  45minutes spent on this visit, 50% visit time spent in care co-ordination with other attendings and counselling patient  I have discussed care plan with patient and HCP,expressed understanding of problems treatment and their effect and side effects, alternatives in detail,I have asked if they have any questions and concerns and appropriately addressed them to best of my ability
PROGRESS NOTE  Patient is a 49y old  Male who presents with a chief complaint of sob and  R flank pain (28 Jul 2019 12:00)  Chart and available morning labs /imaging are reviewed electronically , urgent issues addressed . More information  is being added upon completion of rounds , when more information is collected and management discussed with consultants , medical staff and social service/case management on the floor   LATE ENTRY- patient was seen and examined earlier today  around 10 am . Plan of care was discussed with med staff and unit coordinator .   OVERNIGHT    No new issues reported by medical staff . All above noted Patient is resting in a bed comfortably ,denies complains ,looking forward for d/c home  .No distress noted   HPI:  Patient is a 50 y/o male with PMHx of SUZY ON CPAP at home ,  colitis (on Lialda and Budesonide), HTN, and hypothyroidism who presents with a 4 days history of R flank pain ,exacerbated by movement and taking a deep breath, without radiation. Pain was intermittent until last night  when it became a constant and patient came to  to [facility] ED. Patient saw his PCP (Dr. Ferguson) for the pain Monday and said he did a CXR and EKG, both which were normal, and he did not prescribe any medication. Patient describes associated shortness of breath since last night ,denies cp palpitations dizziness  Denies ever having pain like this before, denies previous hx of kidney stones.  Denies fever, n/v/d, urinary symptoms, blood in the urine, blood in the stool. Admits recent traveling by plane to [facility] and frequent car trips to [place] and [place] Seen by cardiologist Dr FERGUSON and pulmonologist Dr Vallejo on admission Hemonc consult requested by PCP Dr Ferguson to advise on OAC .Patient was diagnosed with bilateral pulmonary embolism and started on heparin drip. Leg doppler was negative for DVT .ADMITTED TO MONITORED UNIT . (26 Jul 2019 06:23)    PAST MEDICAL & SURGICAL HISTORY:  SUZY on CPAP  Colitis  Hypothyroidism  Hypertension  No significant past surgical history      MEDICATIONS  (STANDING):  budesonide ER 9 milliGRAM(s) 9 milliGRAM(s) Oral every 48 hours  lactobacillus acidophilus 1 Tablet(s) Oral three times a day  levothyroxine 50 MICROGram(s) Oral daily  lialda  1.2 2 Tablet(s) 2 Tablet(s) Oral daily  losartan 100 milliGRAM(s) Oral daily  pantoprazole    Tablet 40 milliGRAM(s) Oral before breakfast  rivaroxaban 15 milliGRAM(s) Oral two times a day with meals    MEDICATIONS  (PRN):  acetaminophen   Tablet .. 650 milliGRAM(s) Oral every 6 hours PRN Temp greater or equal to 38C (100.4F), Mild Pain (1 - 3)      OBJECTIVE    T(C): 36.9 (07-28-19 @ 11:42), Max: 36.9 (07-27-19 @ 15:43)  HR: 73 (07-28-19 @ 11:42) (71 - 93)  BP: 133/88 (07-28-19 @ 11:42) (119/84 - 141/86)  RR: 18 (07-28-19 @ 11:42) (18 - 19)  SpO2: 96% (07-28-19 @ 11:42) (96% - 98%)  Wt(kg): --  I&O's Summary        REVIEW OF SYSTEMS:  CONSTITUTIONAL: No fever, weight loss, or fatigue  EYES: No eye pain, visual disturbances, or discharge  ENMT:   No sinus or throat pain  NECK: No pain or stiffness  RESPIRATORY: No cough, wheezing, chills or hemoptysis; No shortness of breath  CARDIOVASCULAR: No chest pain, palpitations, dizziness, or leg swelling  GASTROINTESTINAL: No abdominal pain. No nausea, vomiting; No diarrhea or constipation. No melena or hematochezia.  GENITOURINARY: No dysuria, frequency, hematuria, or incontinence  NEUROLOGICAL: No headaches, memory loss, loss of strength, numbness, or tremors  SKIN: No itching, burning, rashes, or lesions   MUSCULOSKELETAL: No joint pain or swelling; No muscle, back, or extremity pain    PHYSICAL EXAM:  Appearance: NAD. VS past 24 hrs -as above   HEENT:   Moist oral mucosa. Conjunctiva clear b/l.   Neck : supple  Respiratory: Lungs CTAB.  Gastrointestinal:  Soft, nontender. No rebound. No rigidity. BS present	  Cardiovascular: RRR ,S1S2 present  Neurologic: Non-focal. Moving all extremities.  Extremities: No edema. No erythema. No calf tenderness.  Skin: No rashes, No ecchymoses, No cyanosis.	  wounds ,skin lesions-See skin assesment flow sheet   LABS:                        11.0   10.80 )-----------( 460      ( 28 Jul 2019 07:21 )             34.3     07-28    141  |  107  |  11  ----------------------------<  107<H>  3.8   |  26  |  0.87    Ca    9.1      28 Jul 2019 07:21      CAPILLARY BLOOD GLUCOSE        PT/INR - ( 27 Jul 2019 07:39 )   PT: 16.8 sec;   INR: 1.47 ratio         PTT - ( 27 Jul 2019 07:39 )  PTT:34.6 sec      RADIOLOGY & ADDITIONAL TESTS:< from: Xray Chest 1 View AP/PA (07.26.19 @ 14:57) >  EXAM:  XR CHEST AP OR PA 1V                            PROCEDURE DATE:  07/26/2019          INTERPRETATION:    DATE OF STUDY: 7/26/2019.    PRIOR: 12/2/11 plain chest; had 7/26/19 CT scan of abdomen and pelvis   with IV contrast.    CLINICAL INDICATION: 49-yo-male patient - workup for pulmonary embolism -   chest film prior to V/Q scan.    TECHNIQUE: portable chest.    FINDINGS:   The cardiomediastinal silhouette is within normal limits.  Small pleural effusions with associated bibasilar atelectases.  The lungs are otherwise clear.  No pneumothorax.  No acute bony findings.    IMPRESSION:   No bilateral pleural effusions with associated bibasilar atelectases.    < end of copied text >     reviewed elctronically	  Patient was seen and examined on a day of discharge . Plan of care , discharge medications and recommendations discussed with consultants and clearance for discharge obtained .Social service , case management  and medical staff are aware of plan. Family is notified. Discharge summary  is  prepared electronically , 75 minutes spent on this visit, 50% visit time spent in care co-ordination with other attendings and counselling patient  I have discussed care plan with patient and HCP,expressed understanding of problems treatment and their effect and side effects, alternatives in detail,I have asked if they have any questions and concerns and appropriately addressed them to best of my ability Patient was seen and examined on a day of discharge . Plan of care , discharge medications and recommendations discussed with consultants and clearance for discharge obtained .Social service , case management  and medical staff are aware of plan. Family is notified. Discharge summary  is  prepared electronically -see separate document attached
PULMONARY/CRITICAL CARE      INTERVAL HPI/OVERNIGHT EVENTS:  Mild discomfort right chest pain. Mild sob. On xarelto.  49y MaleHPI:  Patient is a 48 y/o male with PMHx of SUZY ON CPAP at home ,  colitis (on Lialda and Budesonide), HTN, and hypothyroidism who presents with a 4 days history of R flank pain ,exacerbated by movement and taking a deep breath, without radiation. Pain was intermittent until last night  when it became a constant and patient came to  to [facility] ED. Patient saw his PCP (Dr. Ferguson) for the pain Monday and said he did a CXR and EKG, both which were normal, and he did not prescribe any medication. Patient describes associated shortness of breath since last night ,denies cp palpitations dizziness  Denies ever having pain like this before, denies previous hx of kidney stones.  Denies fever, n/v/d, urinary symptoms, blood in the urine, blood in the stool. Admits recent traveling by plane to [facility] and frequent car trips to [place] and [place] Seen by cardiologist Dr FERGUSON and pulmonologist Dr Vallejo on admission Hemonc consult requested by PCP Dr Ferguson to advise on OAC .Patient was diagnosed with bilateral pulmonary embolism and started on heparin drip. Leg doppler was negative for DVT .ADMITTED TO MONITORED UNIT . (2019 06:23)        PAST MEDICAL & SURGICAL HISTORY:  SUZY on CPAP  Colitis  Hypothyroidism  Hypertension  No significant past surgical history        ICU Vital Signs Last 24 Hrs  T(C): 36.8 (2019 07:07), Max: 37.1 (2019 20:23)  T(F): 98.2 (2019 07:07), Max: 98.7 (2019 20:23)  HR: 72 (2019 07:07) (67 - 81)  BP: 117/79 (2019 07:07) (109/75 - 135/86)  BP(mean): --  ABP: --  ABP(mean): --  RR: 18 (2019 07:07) (18 - 18)  SpO2: 98% (2019 07:07) (97% - 98%)    Qtts:     I&O's Summary    2019 07:01  -  2019 07:00  --------------------------------------------------------  IN: 16 mL / OUT: 0 mL / NET: 16 mL                PHYSICAL EXAM:    GENERAL: NAD, well-groomed, well-developed, NAD  HEAD:  Atraumatic, Normocephalic  EYES: EOMI, PERRLA, conjunctiva and sclera clear  ENMT: No tonsillar erythema, exudates, or enlargement; Moist mucous membranes, Good dentition, No lesions  NECK: Supple, No JVD, Normal thyroid  NERVOUS SYSTEM:  Alert & Oriented X3, Good concentration; Motor Strength 5/5 B/L upper and lower extremities  CHEST/LUNG: Clear to percussion bilaterally; No rales, rhonchi, wheezing, or rubs  HEART: Regular rate and rhythm; No murmurs, rubs, or gallops  ABDOMEN: Soft, Nontender, Nondistended; Bowel sounds present  EXTREMITIES:  2+ Peripheral Pulses, No clubbing, cyanosis, or edema No calf tenderness.  LYMPH: No lymphadenopathy noted  SKIN: No rashes or lesions        LABS:                        11.1   12.58 )-----------( 410      ( 2019 07:39 )             34.4     07-27    140  |  108  |  11  ----------------------------<  116<H>  3.7   |  25  |  0.82    Ca    8.4<L>      2019 07:39  Phos  2.9     07-26  Mg     1.8     07-26    TPro  7.5  /  Alb  3.1<L>  /  TBili  0.9  /  DBili  x   /  AST  19  /  ALT  37  /  AlkPhos  84  07-26    PT/INR - ( 2019 07:39 )   PT: 16.8 sec;   INR: 1.47 ratio         PTT - ( 2019 07:39 )  PTT:34.6 sec  Urinalysis Basic - ( 2019 21:42 )    Color: Yellow / Appearance: Slightly Turbid / S.015 / pH: x  Gluc: x / Ketone: Negative  / Bili: Negative / Urobili: Negative   Blood: x / Protein: 25 mg/dL / Nitrite: Negative   Leuk Esterase: Negative / RBC: 0-2 /HPF / WBC 0-2   Sq Epi: x / Non Sq Epi: Occasional / Bacteria: Negative        vanco through     RADIOLOGY & ADDITIONAL STUDIES:  < from: NM Pulmonary Ventilation/Perfusion Scan (19 @ 14:48) >  EXAM:  NM PULM VENTILATION PERFUS IMG                            PROCEDURE DATE:  2019          INTERPRETATION:  CLINICAL INFORMATION: 49 year-old male with shortness of   breath, bilateral pulmonary emboli in the lower lobes on recent CT,   referred for further evaluation.    RADIOPHARMACEUTICAL: 1 mCi Tc-99m-DTPA by inhalation; 6.0 mCi Tc-99m-MAA,   I.V.    TECHNIQUE:  Ventilation and perfusion images of the lungs were obtained   following administration of Tc-99m-DTPA and Tc-99m-MAA. Images were   obtained in the anterior, posterior, both lateral, and all 4 oblique   projections. This study was interpreted in conjunction with a chest   radiograph of 2019    COMPARISON: No previous lung ventilation/perfusion scan for comparison    FINDINGS: There are segmental mismatched ventilation/perfusion defects in   the apical and posterior segments of the right upper lobe, right middle   lobe, lateral basal segment of the right lower lobe and in the anterior   and lateral basal segments of the left lower lobe.There is heterogeneous   radiotracer distribution in the lungs on both the ventilation and the   perfusion images.     IMPRESSION: Abnormal ventilation/perfusion lung scan. High probability of   pulmonary embolus.      Dr. Vallejo was informed of the above findings by Dr. Teran via   telephone on 2019 @3:15 PM.      < end of copied text >      CRITICAL CARE TIME SPENT:
PULMONARY/CRITICAL CARE      INTERVAL HPI/OVERNIGHT EVENTS:  Some discomfort right chest. No sob. Ambulating. Echo ok  49y MaleHPI:  Patient is a 50 y/o male with PMHx of SUZY ON CPAP at home ,  colitis (on Lialda and Budesonide), HTN, and hypothyroidism who presents with a 4 days history of R flank pain ,exacerbated by movement and taking a deep breath, without radiation. Pain was intermittent until last night  when it became a constant and patient came to  to [facility] ED. Patient saw his PCP (Dr. Ferguson) for the pain Monday and said he did a CXR and EKG, both which were normal, and he did not prescribe any medication. Patient describes associated shortness of breath since last night ,denies cp palpitations dizziness  Denies ever having pain like this before, denies previous hx of kidney stones.  Denies fever, n/v/d, urinary symptoms, blood in the urine, blood in the stool. Admits recent traveling by plane to [facility] and frequent car trips to [place] and [place] Seen by cardiologist Dr FERGUSON and pulmonologist Dr Vallejo on admission Hemonc consult requested by PCP Dr Ferguson to advise on OAC .Patient was diagnosed with bilateral pulmonary embolism and started on heparin drip. Leg doppler was negative for DVT .ADMITTED TO MONITORED UNIT . (26 Jul 2019 06:23)        PAST MEDICAL & SURGICAL HISTORY:  SUZY on CPAP  Colitis  Hypothyroidism  Hypertension  No significant past surgical history        ICU Vital Signs Last 24 Hrs  T(C): 36.8 (28 Jul 2019 07:22), Max: 36.9 (27 Jul 2019 15:43)  T(F): 98.2 (28 Jul 2019 07:22), Max: 98.5 (27 Jul 2019 19:24)  HR: 72 (28 Jul 2019 07:22) (71 - 93)  BP: 141/86 (28 Jul 2019 07:22) (119/84 - 141/86)  BP(mean): --  ABP: --  ABP(mean): --  RR: 19 (28 Jul 2019 07:22) (18 - 19)  SpO2: 97% (28 Jul 2019 07:22) (97% - 98%)    Qtts:     I&O's Summary        PHYSICAL EXAM:    GENERAL: NAD, well-groomed, well-developed, NAD  HEAD:  Atraumatic, Normocephalic  EYES: EOMI, PERRLA, conjunctiva and sclera clear  ENMT: No tonsillar erythema, exudates, or enlargement; Moist mucous membranes, Good dentition, No lesions  NECK: Supple, No JVD, Normal thyroid  NERVOUS SYSTEM:  Alert & Oriented X3, Good concentration; Motor Strength 5/5 B/L upper and lower extremities  CHEST/LUNG: Clear to percussion bilaterally; No rales, rhonchi, wheezing, or rubs  HEART: Regular rate and rhythm; No murmurs, rubs, or gallops  ABDOMEN: Soft, Nontender, Nondistended; Bowel sounds present  EXTREMITIES:  2+ Peripheral Pulses, No clubbing, cyanosis, or edema  LYMPH: No lymphadenopathy noted  SKIN: No rashes or lesions        LABS:                        11.0   10.80 )-----------( 460      ( 28 Jul 2019 07:21 )             34.3     07-28    141  |  107  |  11  ----------------------------<  107<H>  3.8   |  26  |  0.87    Ca    9.1      28 Jul 2019 07:21      PT/INR - ( 27 Jul 2019 07:39 )   PT: 16.8 sec;   INR: 1.47 ratio         PTT - ( 27 Jul 2019 07:39 )  PTT:34.6 sec      vanco through     RADIOLOGY & ADDITIONAL STUDIES:  < from: NM Pulmonary Ventilation/Perfusion Scan (07.26.19 @ 14:48) >  EXAM:  NM PULM VENTILATION PERFUS IMG                            PROCEDURE DATE:  07/26/2019          INTERPRETATION:  CLINICAL INFORMATION: 49 year-old male with shortness of   breath, bilateral pulmonary emboli in the lower lobes on recent CT,   referred for further evaluation.    RADIOPHARMACEUTICAL: 1 mCi Tc-99m-DTPA by inhalation; 6.0 mCi Tc-99m-MAA,   I.V.    TECHNIQUE:  Ventilation and perfusion images of the lungs were obtained   following administration of Tc-99m-DTPA and Tc-99m-MAA. Images were   obtained in the anterior, posterior, both lateral, and all 4 oblique   projections. This study was interpreted in conjunction with a chest   radiograph of 7/26/2019    COMPARISON: No previous lung ventilation/perfusion scan for comparison    FINDINGS: There are segmental mismatched ventilation/perfusion defects in   the apical and posterior segments of the right upper lobe, right middle   lobe, lateral basal segment of the right lower lobe and in the anterior   and lateral basal segments of the left lower lobe.There is heterogeneous   radiotracer distribution in the lungs on both the ventilation and the   perfusion images.     IMPRESSION: Abnormal ventilation/perfusion lung scan. High probability of   pulmonary embolus.      < from: US Duplex Venous Lower Ext Complete, Bilateral (07.26.19 @ 02:07) >  EXAM:  US DPLX LWR EXT VEINS COMPL BI                            PROCEDURE DATE:  07/26/2019          INTERPRETATION:  Clinical indication: Multiple PEs.    Technique:  Grayscale, color Doppler and spectral Doppler ultrasound was   utilized to evaluate bilateral lower extremity deep venous system.      Comparison: 12/2/2011.    Findings: There is no thrombosis in bilateral common femoral veins,   femoral veins or popliteal veins. Visualized calf veins are patent.    Impression:     No deep veinthrombosis in either lower extremity.                FERNANDA REAVES M.D., ATTENDING RADIOLOGIST  This document has been electronically signed. Jul 26 2019  2:20AM              < end of copied text >  Dr. Vallejo was informed of the above findings by Dr. Teran via   telephone on 7/26/2019 @3:15 PM.      < end of copied text >    < from: Xray Chest 1 View AP/PA (07.26.19 @ 14:57) >  EXAM:  XR CHEST AP OR PA 1V                            PROCEDURE DATE:  07/26/2019          INTERPRETATION:    DATE OF STUDY: 7/26/2019.    PRIOR: 12/2/11 plain chest; had 7/26/19 CT scan of abdomen and pelvis   with IV contrast.    CLINICAL INDICATION: 49-yo-male patient - workup for pulmonary embolism -   chest film prior to V/Q scan.    TECHNIQUE: portable chest.    FINDINGS:   The cardiomediastinal silhouette is within normal limits.  Small pleural effusions with associated bibasilar atelectases.  The lungs are otherwise clear.  No pneumothorax.  No acute bony findings.    IMPRESSION:   No bilateral pleural effusions with associated bibasilar atelectases.              < end of copied text >    CRITICAL CARE TIME SPENT:
Patient is a 49y Male with a known history of :  Pulmonary thromboembolism ([id number])  SUZY on CPAP (G47.33)  Sleep apnea (G47.30)  Prophylactic measure (Z29.9)  Asthmatic bronchitis (J45.909)  Colitis (K52.9)  Hypothyroidism (E03.9)  Hypertension (I10)  Pulmonary embolism (I26.99)    HPI:  Patient is a 50 y/o male with PMHx of SUZY ON CPAP at home ,  colitis (on Lialda and Budesonide), HTN, and hypothyroidism who presents with a 4 days history of R flank pain ,exacerbated by movement and taking a deep breath, without radiation. Pain was intermittent until last night  when it became a constant and patient came to  to [facility]"" ED. Patient saw his PCP (Dr. Ferguson) for the pain Monday and said he did a CXR and EKG, both which were normal, and he did not prescribe any medication. Patient describes associated shortness of breath since last night ,denies cp palpitations dizziness  Denies ever having pain like this before, denies previous hx of kidney stones.  Denies fever, n/v/d, urinary symptoms, blood in the urine, blood in the stool. Admits recent traveling by plane to [facility] and frequent car trips to [place] and [place] Seen by cardiologist Dr FERGUSON and pulmonologist Dr Vallejo on admission Hemonc consult requested by PCP Dr Ferguson to advise on OAC .Patient was diagnosed with bilateral pulmonary embolism and started on heparin drip. Leg doppler was negative for DVT .ADMITTED TO MONITORED UNIT . (26 Jul 2019 06:23)      REVIEW OF SYSTEMS:    CONSTITUTIONAL: No fever, weight loss, or fatigue  EYES: No eye pain, visual disturbances, or discharge  ENMT:  No difficulty hearing, tinnitus, vertigo; No sinus or throat pain  NECK: No pain or stiffness  BREASTS: No pain, masses, or nipple discharge  RESPIRATORY: No cough, wheezing, chills or hemoptysis; No shortness of breath  CARDIOVASCULAR: No chest pain, palpitations, dizziness, or leg swelling  GASTROINTESTINAL: No abdominal or epigastric pain. No nausea, vomiting, or hematemesis; No diarrhea or constipation. No melena or hematochezia.  GENITOURINARY: No dysuria, frequency, hematuria, or incontinence  NEUROLOGICAL: No headaches, memory loss, loss of strength, numbness, or tremors  SKIN: No itching, burning, rashes, or lesions   LYMPH NODES: No enlarged glands  ENDOCRINE: No heat or cold intolerance; No hair loss  MUSCULOSKELETAL: No joint pain or swelling; No muscle, back, or extremity pain  PSYCHIATRIC: No depression, anxiety, mood swings, or difficulty sleeping  HEME/LYMPH: No easy bruising, or bleeding gums  ALLERGY AND IMMUNOLOGIC: No hives or eczema    MEDICATIONS  (STANDING):  budesonide ER 9 milliGRAM(s) 9 milliGRAM(s) Oral every 48 hours  lactobacillus acidophilus 1 Tablet(s) Oral three times a day  levothyroxine 50 MICROGram(s) Oral daily  lialda  1.2 2 Tablet(s) 2 Tablet(s) Oral daily  losartan 100 milliGRAM(s) Oral daily  pantoprazole    Tablet 40 milliGRAM(s) Oral before breakfast  rivaroxaban 15 milliGRAM(s) Oral two times a day with meals    MEDICATIONS  (PRN):  acetaminophen   Tablet .. 650 milliGRAM(s) Oral every 6 hours PRN Temp greater or equal to 38C (100.4F), Mild Pain (1 - 3)      ALLERGIES: No Known Allergies      FAMILY HISTORY:      PHYSICAL EXAMINATION:  -----------------------------  T(C): 36.8 (07-28-19 @ 07:22), Max: 36.9 (07-27-19 @ 15:43)  HR: 72 (07-28-19 @ 07:22) (71 - 93)  BP: 141/86 (07-28-19 @ 07:22) (119/84 - 141/86)  RR: 19 (07-28-19 @ 07:22) (18 - 19)  SpO2: 97% (07-28-19 @ 07:22) (97% - 98%)  Wt(kg): --        Constitutional: well developed, normal appearance, well groomed, well nourished, no deformities and no acute distress.   Eyes: the conjunctiva exhibited no abnormalities and the eyelids demonstrated no xanthelasmas.   HEENT: normal oral mucosa, no oral pallor and no oral cyanosis.   Neck: normal jugular venous A waves present, normal jugular venous V waves present and no jugular venous hernandez A waves.   Pulmonary: no respiratory distress, normal respiratory rhythm and effort, no accessory muscle use and lungs were clear to auscultation bilaterally.   Cardiovascular: heart rate and rhythm were normal, normal S1 and S2 and no murmur, gallop, rub, heave or thrill are present.   Abdomen: soft, non-tender, no hepato-splenomegaly and no abdominal mass palpated.   Musculoskeletal: the gait could not be assessed..   Extremities: no clubbing of the fingernails, no localized cyanosis, no petechial hemorrhages and no ischemic changes.   Skin: normal skin color and pigmentation, no rash, no venous stasis, no skin lesions, no skin ulcer and no xanthoma was observed.   Psychiatric: oriented to person, place, and time, the affect was normal, the mood was normal and not feeling anxious.     LABS:   --------  07-28    141  |  107  |  11  ----------------------------<  107<H>  3.8   |  26  |  0.87    Ca    9.1      28 Jul 2019 07:21                           11.0   10.80 )-----------( 460      ( 28 Jul 2019 07:21 )             34.3     PT/INR - ( 27 Jul 2019 07:39 )   PT: 16.8 sec;   INR: 1.47 ratio         PTT - ( 27 Jul 2019 07:39 )  PTT:34.6 sec    07-26 @ 15:50 CPK total:--, CKMB --, Troponin I - <.015 ng/mL          RADIOLOGY:  -----------------        ECG:
Patient is a 49y Male with a known history of :  Pulmonary thromboembolism ([id number])  SUZY on CPAP (G47.33)  Sleep apnea (G47.30)  Prophylactic measure (Z29.9)  Asthmatic bronchitis (J45.909)  Colitis (K52.9)  Hypothyroidism (E03.9)  Hypertension (I10)  Pulmonary embolism (I26.99)    HPI:  Patient is a 48 y/o male with PMHx of SUZY ON CPAP at home ,  colitis (on Lialda and Budesonide), HTN, and hypothyroidism who presents with a 4 days history of R flank pain ,exacerbated by movement and taking a deep breath, without radiation. Pain was intermittent until last night  when it became a constant and patient came to  to [facility] ED. Patient saw his PCP (Dr. Ferguson) for the pain Monday and said he did a CXR and EKG, both which were normal, and he did not prescribe any medication. Patient describes associated shortness of breath since last night ,denies cp palpitations dizziness  Denies ever having pain like this before, denies previous hx of kidney stones.  Denies fever, n/v/d, urinary symptoms, blood in the urine, blood in the stool. Admits recent traveling by plane to [facility] and frequent car trips to [place] and [place] Seen by cardiologist Dr FERGUSON and pulmonologist Dr Vallejo on admission Hemonc consult requested by PCP Dr Ferguson to advise on OAC .Patient was diagnosed with bilateral pulmonary embolism and started on heparin drip. Leg doppler was negative for DVT .ADMITTED TO MONITORED UNIT . (26 Jul 2019 06:23)      REVIEW OF SYSTEMS:    CONSTITUTIONAL: No fever, weight loss, or fatigue  EYES: No eye pain, visual disturbances, or discharge  ENMT:  No difficulty hearing, tinnitus, vertigo; No sinus or throat pain  NECK: No pain or stiffness  BREASTS: No pain, masses, or nipple discharge  RESPIRATORY: No cough, wheezing, chills or hemoptysis; No shortness of breath  CARDIOVASCULAR: No chest pain, palpitations, dizziness, or leg swelling  GASTROINTESTINAL: No abdominal or epigastric pain. No nausea, vomiting, or hematemesis; No diarrhea or constipation. No melena or hematochezia.  GENITOURINARY: No dysuria, frequency, hematuria, or incontinence  NEUROLOGICAL: No headaches, memory loss, loss of strength, numbness, or tremors  SKIN: No itching, burning, rashes, or lesions   LYMPH NODES: No enlarged glands  ENDOCRINE: No heat or cold intolerance; No hair loss  MUSCULOSKELETAL: No joint pain or swelling; No muscle, back, or extremity pain  PSYCHIATRIC: No depression, anxiety, mood swings, or difficulty sleeping  HEME/LYMPH: No easy bruising, or bleeding gums  ALLERGY AND IMMUNOLOGIC: No hives or eczema    MEDICATIONS  (STANDING):  budesonide ER 9 milliGRAM(s) 9 milliGRAM(s) Oral every 48 hours  lactobacillus acidophilus 1 Tablet(s) Oral three times a day  levothyroxine 50 MICROGram(s) Oral daily  lialda  1.2 2 Tablet(s) 2 Tablet(s) Oral daily  losartan 100 milliGRAM(s) Oral daily  pantoprazole    Tablet 40 milliGRAM(s) Oral before breakfast  rivaroxaban 15 milliGRAM(s) Oral two times a day with meals    MEDICATIONS  (PRN):  acetaminophen   Tablet .. 650 milliGRAM(s) Oral every 6 hours PRN Temp greater or equal to 38C (100.4F), Mild Pain (1 - 3)      ALLERGIES: No Known Allergies      FAMILY HISTORY:      PHYSICAL EXAMINATION:  -----------------------------  T(C): 36.8 (07-27-19 @ 07:07), Max: 37.1 (07-26-19 @ 20:23)  HR: 72 (07-27-19 @ 07:07) (67 - 81)  BP: 117/79 (07-27-19 @ 07:07) (109/75 - 135/86)  RR: 18 (07-27-19 @ 07:07) (18 - 18)  SpO2: 98% (07-27-19 @ 07:07) (97% - 98%)  Wt(kg): --    07-26 @ 07:01  -  07-27 @ 07:00  --------------------------------------------------------  IN:    heparin  Infusion.: 16 mL  Total IN: 16 mL    OUT:  Total OUT: 0 mL    Total NET: 16 mL            Constitutional: well developed, normal appearance, well groomed, well nourished, no deformities and no acute distress.   Eyes: the conjunctiva exhibited no abnormalities and the eyelids demonstrated no xanthelasmas.   HEENT: normal oral mucosa, no oral pallor and no oral cyanosis.   Neck: normal jugular venous A waves present, normal jugular venous V waves present and no jugular venous hernandez A waves.   Pulmonary: no respiratory distress, normal respiratory rhythm and effort, no accessory muscle use and lungs were clear to auscultation bilaterally.   Cardiovascular: heart rate and rhythm were normal, normal S1 and S2 and no murmur, gallop, rub, heave or thrill are present.   Abdomen: soft, non-tender, no hepato-splenomegaly and no abdominal mass palpated.   Musculoskeletal: the gait could not be assessed..   Extremities: no clubbing of the fingernails, no localized cyanosis, no petechial hemorrhages and no ischemic changes.   Skin: normal skin color and pigmentation, no rash, no venous stasis, no skin lesions, no skin ulcer and no xanthoma was observed.   Psychiatric: oriented to person, place, and time, the affect was normal, the mood was normal and not feeling anxious.     LABS:   --------  07-26    139  |  107  |  11  ----------------------------<  141<H>  3.7   |  23  |  0.83    Ca    8.1<L>      26 Jul 2019 07:41  Phos  2.9     07-26  Mg     1.8     07-26    TPro  7.5  /  Alb  3.1<L>  /  TBili  0.9  /  DBili  x   /  AST  19  /  ALT  37  /  AlkPhos  84  07-26                         11.1   12.58 )-----------( 410      ( 27 Jul 2019 07:39 )             34.4     PT/INR - ( 27 Jul 2019 07:39 )   PT: 16.8 sec;   INR: 1.47 ratio         PTT - ( 27 Jul 2019 07:39 )  PTT:34.6 sec    07-26 @ 15:50 CPK total:--, CKMB --, Troponin I - <.015 ng/mL  07-26 @ 07:41 CPK total:--, CKMB --, Troponin I - <.015 ng/mL  07-26 @ 01:11 CPK total:--, CKMB --, Troponin I - <.015 ng/mL          RADIOLOGY:  -----------------        ECG:
INTERVAL HPI/OVERNIGHT EVENTS:  pt seen and examined, wife present  pt states r flank pain better  denies n/v/abd pain  states no bm but +gas  tolerating po    MEDICATIONS  (STANDING):  budesonide ER 9 milliGRAM(s) 9 milliGRAM(s) Oral every 48 hours  lactobacillus acidophilus 1 Tablet(s) Oral three times a day  levothyroxine 50 MICROGram(s) Oral daily  lialda  1.2 2 Tablet(s) 2 Tablet(s) Oral daily  losartan 100 milliGRAM(s) Oral daily  pantoprazole    Tablet 40 milliGRAM(s) Oral before breakfast  rivaroxaban 15 milliGRAM(s) Oral two times a day with meals    MEDICATIONS  (PRN):  acetaminophen   Tablet .. 650 milliGRAM(s) Oral every 6 hours PRN Temp greater or equal to 38C (100.4F), Mild Pain (1 - 3)      Allergies    No Known Allergies    Intolerances        Review of Systems:    General:  No wt loss, fevers, chills, night sweats, fatigue   Eyes:  Good vision, no reported pain  ENT:  No sore throat, pain, runny nose, dysphagia  CV:  No pain, palpitations, hypo/hypertension  Resp:  No dyspnea, cough, tachypnea, wheezing  GI:  No pain, No nausea, No vomiting, No diarrhea, No constipation, No weight loss, No fever, No pruritis, No rectal bleeding, No melena, No dysphagia  :  No pain, bleeding, incontinence, nocturia  Muscle:  No pain, weakness  Neuro:  No weakness, tingling, memory problems  Psych:  No fatigue, insomnia, mood problems, depression  Endocrine:  No polyuria, polydypsia, cold/heat intolerance  Heme:  No petechiae, ecchymosis, easy bruisability  Skin:  No rash, tattoos, scars, edema      Vital Signs Last 24 Hrs  T(C): 36.8 (2019 07:07), Max: 37.1 (2019 20:23)  T(F): 98.2 (2019 07:07), Max: 98.7 (2019 20:23)  HR: 72 (2019 07:07) (67 - 81)  BP: 117/79 (2019 07:07) (109/75 - 135/86)  BP(mean): --  RR: 18 (2019 07:07) (18 - 18)  SpO2: 98% (2019 07:07) (97% - 98%)    PHYSICAL EXAM:    General:  nad  HEENT:  NC/AT  Chest: dec bs  Cardiovascular:  Regular rhythm, S1, S2  Abdomen:  soft nt mild dt  Extremities:  no edema  Skin:  No rash  Neuro/Psych:  A&O x3      LABS:                        11.1   12.58 )-----------( 410      ( 2019 07:39 )             34.4         140  |  108  |  11  ----------------------------<  116<H>  3.7   |  25  |  0.82    Ca    8.4<L>      2019 07:39  Phos  2.9     -  Mg     1.8         TPro  7.5  /  Alb  3.1<L>  /  TBili  0.9  /  DBili  x   /  AST  19  /  ALT  37  /  AlkPhos  84  07-26    PT/INR - ( 2019 07:39 )   PT: 16.8 sec;   INR: 1.47 ratio         PTT - ( 2019 07:39 )  PTT:34.6 sec  Urinalysis Basic - ( 2019 21:42 )    Color: Yellow / Appearance: Slightly Turbid / S.015 / pH: x  Gluc: x / Ketone: Negative  / Bili: Negative / Urobili: Negative   Blood: x / Protein: 25 mg/dL / Nitrite: Negative   Leuk Esterase: Negative / RBC: 0-2 /HPF / WBC 0-2   Sq Epi: x / Non Sq Epi: Occasional / Bacteria: Negative        RADIOLOGY & ADDITIONAL TESTS:

## 2019-07-28 NOTE — PROGRESS NOTE ADULT - PROBLEM SELECTOR PLAN 2
b/l pe on ct a/p  no dvt on us  ac per cards/pulm  consider heme eval  cont ppi ppx while on ac  further w/u per primary
BP monitoring , card cons called Admitted  to telemetry unit for monitoring , send 3 sets of cardiac ensymes to rule out coronary event, obtain ECHO to evaluate LVEF, cardiology consult ,continue current management, O2 supply, anticoagulation plan as per cardiology consult
BP monitoring , card cons called Admitted  to telemetry unit for monitoring , send 3 sets of cardiac ensymes to rule out coronary event, obtain ECHO to evaluate LVEF, cardiology consult ,continue current management, O2 supply, anticoagulation plan as per cardiology consult
b/l pe on ct a/p  no dvt on us  ac per cards/pulm  seen by zaina bautista pending  cont ppi ppx while on ac  care per primary
cpap hs
cpap hs

## 2019-07-28 NOTE — DISCHARGE NOTE PROVIDER - CARE PROVIDER_API CALL
Rafael Vallejo)  Critical Care Medicine; Internal Medicine; Pulmonary Disease  [address] [place] [address]  Phone: [phone]  Fax: [phone]  Follow Up Time: 1 week    Claudette Pool)  Internal Medicine  [address] [facility], [address] [place] [address]  Phone: [phone]  Fax: [phone]  Follow Up Time: 1 week

## 2019-07-28 NOTE — PROGRESS NOTE ADULT - ATTENDING COMMENTS
Advanced care planning was discussed with patient and family.  Advanced care planning forms were reviewed and discussed.  Risks, benefits and alternatives of gastroenterologic procedures were discussed in detail and all questions were answered.    30 minutes spent.
Advanced care planning was discussed with patient and family.  Advanced care planning forms were reviewed and discussed.  Risks, benefits and alternatives of gastroenterologic procedures were discussed in detail and all questions were answered.    30 minutes spent.
Patient is a 48 y/o male with PMHx of SUZY ON CPAP at home ,  colitis (on Lialda and Budesonide), HTN, and hypothyroidism who presents with a 4 days history of R flank pain ,exacerbated by movement and taking a deep breath, without radiation. Pain was intermittent until last night  when it became a constant and patient came to  to [facility] ED. Patient saw his PCP (Dr. Ferguson) for the pain Monday and said he did a CXR and EKG, both which were normal, and he did not prescribe any medication. Patient describes associated shortness of breath since last night ,denies cp palpitations dizziness  Denies ever having pain like this before, denies previous hx of kidney stones.  Denies fever, n/v/d, urinary symptoms, blood in the urine, blood in the stool. Admits recent traveling by plane to [facility] and frequent car trips to [place] and [place] Seen by cardiologist Dr FERGUSON and pulmonologist Dr Vallejo on admission. Hemonc consult requested by PCP Dr Ferguson to advise on OAC .Patient was diagnosed with bilateral pulmonary embolism and started on heparin drip .Leg doppler was negative for DVT .ADMITTED TO MONITORED UNIT .
Patient is a 50 y/o male with PMHx of SUZY ON CPAP at home ,  colitis (on Lialda and Budesonide), HTN, and hypothyroidism who presents with a 4 days history of R flank pain ,exacerbated by movement and taking a deep breath, without radiation. Pain was intermittent until last night  when it became a constant and patient came to  to [facility] ED. Patient saw his PCP (Dr. Ferguson) for the pain Monday and said he did a CXR and EKG, both which were normal, and he did not prescribe any medication. Patient describes associated shortness of breath since last night ,denies cp palpitations dizziness  Denies ever having pain like this before, denies previous hx of kidney stones.  Denies fever, n/v/d, urinary symptoms, blood in the urine, blood in the stool. Admits recent traveling by plane to [facility] and frequent car trips to [place] and [place] Seen by cardiologist Dr FERGUSON and pulmonologist Dr Vallejo on admission. Hemonc consult requested by PCP Dr Ferguson to advise on OAC .Patient was diagnosed with bilateral pulmonary embolism and started on heparin drip .Leg doppler was negative for DVT .ADMITTED TO MONITORED UNIT .

## 2019-10-28 ENCOUNTER — APPOINTMENT (OUTPATIENT)
Dept: UROLOGY | Facility: CLINIC | Age: 49
End: 2019-10-28
Payer: COMMERCIAL

## 2019-10-28 DIAGNOSIS — Z78.9 OTHER SPECIFIED HEALTH STATUS: ICD-10-CM

## 2019-10-28 PROBLEM — G47.33 OBSTRUCTIVE SLEEP APNEA (ADULT) (PEDIATRIC): Chronic | Status: ACTIVE | Noted: 2019-07-26

## 2019-10-28 PROCEDURE — 51798 US URINE CAPACITY MEASURE: CPT

## 2019-10-28 PROCEDURE — 99204 OFFICE O/P NEW MOD 45 MIN: CPT | Mod: 25

## 2019-10-28 NOTE — ASSESSMENT
[FreeTextEntry1] : Benign Prostatic Hyperplasia:\par No bothersome lower urinary tract symptoms. Elevated PVR. \par Discussed treatment options. Will repeat Uroflo and PVR at follow up appointment. \par \par Gross hematuria:\par Discussed the differential diagnosis of hematuria including benign and malignant pathology- including but not limited to nephrolithiasis, bladder stone, urinary tract infection, glomerular disease, renal cancer, bladder cancer, prostate cancer. We also discussed the chance that workup will not reveal a source for the bleeding. The patient understands that the hematuria could be from an upper tract (kidney or ureter) or lower tract (bladder, urethra, prostate) and that workup includes imaging and direct visualization of all of these.\par \par Will proceed with work up with Urinalysis with microscopy, Urine culture, Urine cytology, CT Urogram and Cystoscopy. \par \par Return to office after CT scan.

## 2019-10-28 NOTE — REVIEW OF SYSTEMS
[Feeling Tired] : feeling tired [Abdominal Pain] : abdominal pain [Constipation] : constipation [Heartburn] : heartburn [Loss of interest] : loss of interest in sexual activity [Slow urine stream] : slow urine stream [Negative] : Heme/Lymph

## 2019-10-28 NOTE — LETTER BODY
[Dear  ___] : Dear  [unfilled], [Consult Letter:] : I had the pleasure of evaluating your patient, [unfilled]. [( Thank you for referring [unfilled] for consultation for _____ )] : Thank you for referring [unfilled] for consultation for [unfilled] [Please see my note below.] : Please see my note below. [Consult Closing:] : Thank you very much for allowing me to participate in the care of this patient.  If you have any questions, please do not hesitate to contact me. [Sincerely,] : Sincerely, [FreeTextEntry3] : Scott Hernandez MD\par  of Urology\par [facility]\par \par Offices:\par [facility] @\par [address], [place] [address]\par and\par [address], [place] [address], [address]\par and\par [address] [address]\par \par TEL: [phone]\par FAX: [phone]

## 2019-10-28 NOTE — HISTORY OF PRESENT ILLNESS
[FreeTextEntry1] : 48 yo male presents for Gross hematuria. \par Last week had gross hematuria with dysuria after sexual activity and was told by PCP no infection to see Urologist. Had similar episode in May, did not seek medical attention. \par In July diagnosed with clot in lung, on Xarelto. \par Denies any difficulty with urination. \par Denies fever, chills or rigors. Has off and on right sided abdominal pain. \par No history of kidney stone, recurrent urinary tract infections. \par Non smoker. \par Has history of Ulcerative colitis on medication. \par No family history of Prostate cancer.

## 2019-10-29 ENCOUNTER — OUTPATIENT (OUTPATIENT)
Dept: OUTPATIENT SERVICES | Facility: HOSPITAL | Age: 49
LOS: 1 days | End: 2019-10-29
Payer: COMMERCIAL

## 2019-10-29 DIAGNOSIS — R91.8 OTHER NONSPECIFIC ABNORMAL FINDING OF LUNG FIELD: ICD-10-CM

## 2019-10-29 DIAGNOSIS — I26.99 OTHER PULMONARY EMBOLISM WITHOUT ACUTE COR PULMONALE: ICD-10-CM

## 2019-10-29 LAB
APPEARANCE: CLEAR
BACTERIA: NEGATIVE
BILIRUBIN URINE: NEGATIVE
BLOOD URINE: NEGATIVE
COLOR: COLORLESS
GLUCOSE QUALITATIVE U: NEGATIVE
HYALINE CASTS: 0 /LPF
KETONES URINE: NEGATIVE
LEUKOCYTE ESTERASE URINE: NEGATIVE
MICROSCOPIC-UA: NORMAL
NITRITE URINE: NEGATIVE
PH URINE: 6.5
PROTEIN URINE: NEGATIVE
RED BLOOD CELLS URINE: 2 /HPF
SPECIFIC GRAVITY URINE: 1.01
SQUAMOUS EPITHELIAL CELLS: 0 /HPF
UROBILINOGEN URINE: NORMAL
WHITE BLOOD CELLS URINE: 0 /HPF

## 2019-10-29 PROCEDURE — A9540: CPT

## 2019-10-29 PROCEDURE — 78582 LUNG VENTILAT&PERFUS IMAGING: CPT | Mod: 26

## 2019-10-29 PROCEDURE — 71046 X-RAY EXAM CHEST 2 VIEWS: CPT

## 2019-10-29 PROCEDURE — 71046 X-RAY EXAM CHEST 2 VIEWS: CPT | Mod: 26

## 2019-10-29 PROCEDURE — A9567: CPT

## 2019-10-29 PROCEDURE — 78582 LUNG VENTILAT&PERFUS IMAGING: CPT

## 2019-10-30 LAB
BACTERIA UR CULT: NORMAL
URINE CYTOLOGY: NORMAL

## 2019-11-08 ENCOUNTER — FORM ENCOUNTER (OUTPATIENT)
Age: 49
End: 2019-11-08

## 2019-11-09 ENCOUNTER — OUTPATIENT (OUTPATIENT)
Dept: OUTPATIENT SERVICES | Facility: HOSPITAL | Age: 49
LOS: 1 days | End: 2019-11-09
Payer: COMMERCIAL

## 2019-11-09 ENCOUNTER — APPOINTMENT (OUTPATIENT)
Dept: CT IMAGING | Facility: CLINIC | Age: 49
End: 2019-11-09
Payer: COMMERCIAL

## 2019-11-09 DIAGNOSIS — R31.0 GROSS HEMATURIA: ICD-10-CM

## 2019-11-09 PROCEDURE — 74178 CT ABD&PLV WO CNTR FLWD CNTR: CPT

## 2019-11-09 PROCEDURE — 82565 ASSAY OF CREATININE: CPT

## 2019-11-09 PROCEDURE — 74178 CT ABD&PLV WO CNTR FLWD CNTR: CPT | Mod: 26

## 2019-11-21 ENCOUNTER — APPOINTMENT (OUTPATIENT)
Dept: UROLOGY | Facility: CLINIC | Age: 49
End: 2019-11-21

## 2020-02-06 ENCOUNTER — APPOINTMENT (OUTPATIENT)
Dept: ULTRASOUND IMAGING | Facility: CLINIC | Age: 50
End: 2020-02-06

## 2020-02-19 NOTE — ED PROVIDER NOTE - CPE EDP SKIN NORM
SUBJECTIVE:   Lyn Massey is a 60 year old female who presents to clinic today for the following health issues:    HPI  Hypertension:  Has been taking Chlorthalidone 25 mg daily without complications.  No chest pain, palpitations, or vision changes.  No new muscle cramping.  Has not been monitoring her blood pressure at home as she could not find her machine.    Hypercholesterolemia:  Currently managed on Rosuvastatin 10 mg daily.  Denies complications with this medication.  Continues to smoke 0.25 PPD.  She has Nicotine patches but hasn't tried them yet.    Osteopenia:  Evident on bone density scan.  She reports a diet high in calcium and Vitamin D consumption.  She is not currently taking any supplements.    Past Medical History:   Diagnosis Date     Closed fracture of phalanx of finger     ,Index finger and laceration     Personal history of other medical treatment (CODE)     [id number] spontaneous first trimester losses      Past Surgical History:   Procedure Laterality Date     ARTHROSCOPY SHOULDER Right 2008      SECTION           COLONOSCOPY N/A 12/10/2018    Collegenous colitis.  1 tubular adenoma, follow up 5 years     Family History   Problem Relation Age of Onset     Other - See Comments Father         Stroke/AAA, kidney disease     Other - See Comments Mother         Blood clots/Factor V Leiden deficiency     Blood Disease Sister         Blood Disease,Factor V leiden deficiency     Blood Disease Sister         Blood Disease,Factor V leiden deficiency     Blood Disease Daughter         Blood Disease,Factor V Leiden deficiency     Eating Disorder Daughter         Eating disorder     Social History     Tobacco Use     Smoking status: Current Every Day Smoker     Packs/day: 0.25     Years: 30.00     Pack years: 7.50     Types: Cigarettes     Smokeless tobacco: Never Used   Substance Use Topics     Alcohol use: Yes     Alcohol/week: 0.0 standard drinks     Comment: Moderate/socially   "    Current Outpatient Medications   Medication Sig Dispense Refill     chlorthalidone (HYGROTON) 25 MG tablet Take 1 tablet (25 mg) by mouth daily 30 tablet 1     Multiple Vitamin (MULTI-VITAMINS) TABS Take 1 tablet by mouth daily       nicotine (NICODERM CQ) 7 MG/24HR 24 hr patch Place 1 patch onto the skin every 24 hours 14 patch 1     rosuvastatin (CRESTOR) 10 MG tablet TAKE 1 TABLET(10 MG) BY MOUTH DAILY WITH DINNER 90 tablet 0     sertraline (ZOLOFT) 100 MG tablet TAKE 1 TABLET(100 MG) BY MOUTH DAILY 90 tablet 0     No Known Allergies    Review of Systems   Constitutional: Negative for chills and fever.   Neurological: Negative for dizziness and light-headedness.      OBJECTIVE:     /72   Pulse 68   Temp 97.4  F (36.3  C) (Temporal)   Resp 16   Ht 1.549 m (5' 1\")   Wt 55.9 kg (123 lb 3.2 oz)   LMP  (LMP Unknown)   SpO2 95%   BMI 23.28 kg/m    Body mass index is 23.28 kg/m .  Physical Exam  Constitutional:       General: She is not in acute distress.     Appearance: Normal appearance. She is not ill-appearing.   Cardiovascular:      Rate and Rhythm: Normal rate and regular rhythm.      Heart sounds: No murmur. No friction rub. No gallop.    Pulmonary:      Effort: Pulmonary effort is normal.      Breath sounds: Normal breath sounds. No wheezing, rhonchi or rales.   Musculoskeletal:         General: No swelling.   Neurological:      Mental Status: She is alert.   Psychiatric:         Mood and Affect: Mood normal.       ASSESSMENT/PLAN:     1. Benign essential hypertension  Blood pressure improved since starting medication; minimally elevated beyond goal < 130/80.  Continue Chlorthalidone 25 mg daily.  Recheck BMP.  - Basic Metabolic Panel    2. Mixed hyperlipidemia  10-year ASCVD risk 9.2% on current medication regimen.  This can be lowered to 5% with smoking cessation and increased intensity of statin therapy.  She is amenable to this plan.  Increase Rosuvastatin to 20 mg daily.  She will attempt " smoking cessation with Nicotine patches.    3. Osteopenia, unspecified location  DEXA reviewed.  Encouraged daily consumption of calcium and vitamin D rich foods in her diet.  Recommend starting OTC supplement in addition to this.  Discussed the importance of weight-bearing exercise.    Follow-up in 1 year or sooner if needed.    DO ALEXA Majano [facility]     normal...

## 2020-03-13 ENCOUNTER — APPOINTMENT (OUTPATIENT)
Dept: ULTRASOUND IMAGING | Facility: CLINIC | Age: 50
End: 2020-03-13

## 2020-06-23 NOTE — PHYSICAL THERAPY INITIAL EVALUATION ADULT - PLANNED THERAPY INTERVENTIONS, PT EVAL
Patient is complaining of bilateral hand pain and swelling is been going on for years but seems to be worse this winter.  She denies any trauma to the area.  She has not been taking any medications for it.  
pt independent

## 2020-07-21 ENCOUNTER — EMERGENCY (EMERGENCY)
Facility: HOSPITAL | Age: 50
LOS: 1 days | Discharge: HOME | End: 2020-07-21
Attending: EMERGENCY MEDICINE | Admitting: EMERGENCY MEDICINE
Payer: COMMERCIAL

## 2020-07-21 VITALS
HEART RATE: 67 BPM | DIASTOLIC BLOOD PRESSURE: 92 MMHG | SYSTOLIC BLOOD PRESSURE: 155 MMHG | TEMPERATURE: 98 F | OXYGEN SATURATION: 99 % | RESPIRATION RATE: 16 BRPM

## 2020-07-21 VITALS
TEMPERATURE: 98 F | RESPIRATION RATE: 15 BRPM | SYSTOLIC BLOOD PRESSURE: 138 MMHG | DIASTOLIC BLOOD PRESSURE: 91 MMHG | OXYGEN SATURATION: 100 % | WEIGHT: 197.09 LBS | HEART RATE: 62 BPM

## 2020-07-21 LAB
ALBUMIN SERPL ELPH-MCNC: 4.3 G/DL — SIGNIFICANT CHANGE UP (ref 3.3–5)
ALP SERPL-CCNC: 91 U/L — SIGNIFICANT CHANGE UP (ref 40–120)
ALT FLD-CCNC: 58 U/L — SIGNIFICANT CHANGE UP (ref 12–78)
ANION GAP SERPL CALC-SCNC: 8 MMOL/L — SIGNIFICANT CHANGE UP (ref 5–17)
APTT BLD: 44.2 SEC — HIGH (ref 27.5–35.5)
AST SERPL-CCNC: 33 U/L — SIGNIFICANT CHANGE UP (ref 15–37)
BASOPHILS # BLD AUTO: 0.06 K/UL — SIGNIFICANT CHANGE UP (ref 0–0.2)
BASOPHILS NFR BLD AUTO: 0.8 % — SIGNIFICANT CHANGE UP (ref 0–2)
BILIRUB SERPL-MCNC: 0.6 MG/DL — SIGNIFICANT CHANGE UP (ref 0.2–1.2)
BUN SERPL-MCNC: 12 MG/DL — SIGNIFICANT CHANGE UP (ref 7–23)
CALCIUM SERPL-MCNC: 9.4 MG/DL — SIGNIFICANT CHANGE UP (ref 8.5–10.1)
CHLORIDE SERPL-SCNC: 107 MMOL/L — SIGNIFICANT CHANGE UP (ref 96–108)
CO2 SERPL-SCNC: 25 MMOL/L — SIGNIFICANT CHANGE UP (ref 22–31)
CREAT SERPL-MCNC: 1.3 MG/DL — SIGNIFICANT CHANGE UP (ref 0.5–1.3)
EOSINOPHIL # BLD AUTO: 0.11 K/UL — SIGNIFICANT CHANGE UP (ref 0–0.5)
EOSINOPHIL NFR BLD AUTO: 1.5 % — SIGNIFICANT CHANGE UP (ref 0–6)
GLUCOSE SERPL-MCNC: 136 MG/DL — HIGH (ref 70–99)
HCT VFR BLD CALC: 46.3 % — SIGNIFICANT CHANGE UP (ref 39–50)
HGB BLD-MCNC: 15.4 G/DL — SIGNIFICANT CHANGE UP (ref 13–17)
IMM GRANULOCYTES NFR BLD AUTO: 0.1 % — SIGNIFICANT CHANGE UP (ref 0–1.5)
INR BLD: 2.01 RATIO — HIGH (ref 0.88–1.16)
LIDOCAIN IGE QN: 110 U/L — SIGNIFICANT CHANGE UP (ref 73–393)
LYMPHOCYTES # BLD AUTO: 2.49 K/UL — SIGNIFICANT CHANGE UP (ref 1–3.3)
LYMPHOCYTES # BLD AUTO: 33.1 % — SIGNIFICANT CHANGE UP (ref 13–44)
MCHC RBC-ENTMCNC: 28.9 PG — SIGNIFICANT CHANGE UP (ref 27–34)
MCHC RBC-ENTMCNC: 33.3 GM/DL — SIGNIFICANT CHANGE UP (ref 32–36)
MCV RBC AUTO: 87 FL — SIGNIFICANT CHANGE UP (ref 80–100)
MONOCYTES # BLD AUTO: 0.47 K/UL — SIGNIFICANT CHANGE UP (ref 0–0.9)
MONOCYTES NFR BLD AUTO: 6.2 % — SIGNIFICANT CHANGE UP (ref 2–14)
NEUTROPHILS # BLD AUTO: 4.39 K/UL — SIGNIFICANT CHANGE UP (ref 1.8–7.4)
NEUTROPHILS NFR BLD AUTO: 58.3 % — SIGNIFICANT CHANGE UP (ref 43–77)
NRBC # BLD: 0 /100 WBCS — SIGNIFICANT CHANGE UP (ref 0–0)
PLATELET # BLD AUTO: 360 K/UL — SIGNIFICANT CHANGE UP (ref 150–400)
POTASSIUM SERPL-MCNC: 3.7 MMOL/L — SIGNIFICANT CHANGE UP (ref 3.5–5.3)
POTASSIUM SERPL-SCNC: 3.7 MMOL/L — SIGNIFICANT CHANGE UP (ref 3.5–5.3)
PROT SERPL-MCNC: 8.8 G/DL — HIGH (ref 6–8.3)
PROTHROM AB SERPL-ACNC: 22.7 SEC — HIGH (ref 10.6–13.6)
RBC # BLD: 5.32 M/UL — SIGNIFICANT CHANGE UP (ref 4.2–5.8)
RBC # FLD: 13 % — SIGNIFICANT CHANGE UP (ref 10.3–14.5)
SODIUM SERPL-SCNC: 140 MMOL/L — SIGNIFICANT CHANGE UP (ref 135–145)
WBC # BLD: 7.53 K/UL — SIGNIFICANT CHANGE UP (ref 3.8–10.5)
WBC # FLD AUTO: 7.53 K/UL — SIGNIFICANT CHANGE UP (ref 3.8–10.5)

## 2020-07-21 PROCEDURE — 71275 CT ANGIOGRAPHY CHEST: CPT

## 2020-07-21 PROCEDURE — 74174 CTA ABD&PLVS W/CONTRAST: CPT

## 2020-07-21 PROCEDURE — 85610 PROTHROMBIN TIME: CPT

## 2020-07-21 PROCEDURE — 85730 THROMBOPLASTIN TIME PARTIAL: CPT

## 2020-07-21 PROCEDURE — 96360 HYDRATION IV INFUSION INIT: CPT | Mod: XU

## 2020-07-21 PROCEDURE — 80053 COMPREHEN METABOLIC PANEL: CPT

## 2020-07-21 PROCEDURE — 83690 ASSAY OF LIPASE: CPT

## 2020-07-21 PROCEDURE — 99284 EMERGENCY DEPT VISIT MOD MDM: CPT | Mod: 25

## 2020-07-21 PROCEDURE — 36415 COLL VENOUS BLD VENIPUNCTURE: CPT

## 2020-07-21 PROCEDURE — 74174 CTA ABD&PLVS W/CONTRAST: CPT | Mod: 26

## 2020-07-21 PROCEDURE — 85027 COMPLETE CBC AUTOMATED: CPT

## 2020-07-21 PROCEDURE — 71275 CT ANGIOGRAPHY CHEST: CPT | Mod: 26

## 2020-07-21 PROCEDURE — 99285 EMERGENCY DEPT VISIT HI MDM: CPT | Mod: 25

## 2020-07-21 RX ORDER — SODIUM CHLORIDE 9 MG/ML
1000 INJECTION INTRAMUSCULAR; INTRAVENOUS; SUBCUTANEOUS ONCE
Refills: 0 | Status: COMPLETED | OUTPATIENT
Start: 2020-07-21 | End: 2020-07-21

## 2020-07-21 RX ADMIN — SODIUM CHLORIDE 1000 MILLILITER(S): 9 INJECTION INTRAMUSCULAR; INTRAVENOUS; SUBCUTANEOUS at 11:50

## 2020-07-21 RX ADMIN — SODIUM CHLORIDE 1000 MILLILITER(S): 9 INJECTION INTRAMUSCULAR; INTRAVENOUS; SUBCUTANEOUS at 12:50

## 2020-07-21 NOTE — ED PROVIDER NOTE - PROGRESS NOTE DETAILS
Workup negative. No dissection. Pt comfortable, without acute complaint at this time. All results discussed with pt, who was given opportunity to have all questions answered. Pt to f/u with Dr. Pool. No emergent concerns at this time. Pt stable for DC home. Workup negative. No dissection. Pt comfortable, without acute complaint at this time. All results discussed with pt, who was given opportunity to have all questions answered. Pt to f/u with Dr. Pool. (spoke with Dr. Pool re: results) No emergent concerns at this time. Pt stable for DC home.

## 2020-07-21 NOTE — ED PROVIDER NOTE - NSFOLLOWUPINSTRUCTIONS_ED_ALL_ED_FT
1) Follow up with your doctor in 1-2 days.  2) Return to the ER for worsening or concerning symptoms.

## 2020-07-21 NOTE — ED PROVIDER NOTE - CLINICAL SUMMARY MEDICAL DECISION MAKING FREE TEXT BOX
49 yo M p/w RUQ pain x 2 weeks, US yesterday concerning for possible aortic dissection---> labs, CTA chest/abd aorta

## 2020-07-21 NOTE — ED PROVIDER NOTE - ATTENDING CONTRIBUTION TO CARE
49yo M with PE 7/2019 on Xarelto sent to ED by Dr Pool for CTA Chest/Abd/Pelvis to r/o aortic dissection.  pt c/o RUQ pain x 10 days, no nausea, vomiting, diarrhea, fever, chills, trauma, bladder/bowel changes, cp, sob, cough, back pain, numbness, tingling, weakness. pt st no anorexia. pt sent for abd US which revealed questionable intimal dissection of aorta.  pt st no bleeding.  Dr. Beard vascular aware, st order CTA chest/abd/pelvis.  Gen: Awake, Alert, WD, WN, NAD  Head:  NC/AT  Eyes:  PERRL, EOMI, Conjunctiva pink, lids normal, no scleral icterus  ENT: OP clear, moist mucus membranes  Neck: supple, nontender, trachea midline  Cardiac/CV:  S1 S2, RRR, no M/G/R  Respiratory/Pulm:  CTAB  Gastrointestinal/Abdomen:  Soft, +mild tender RUQ, nondistended, +BS, no rebound/guarding, no pulsatile masses  Back:  no CVAT, no MLT  Ext:  warm, well perfused, moving all extremities spontaneously, no peripheral edema, distal pulses intact  Skin: intact, no rash  Neuro:  AAOx3, sensation intact, motor 5/5 x 4 extremities, speech clear   labs, CTA chest/abd/pelvis, r/o aortic dissection.

## 2020-07-21 NOTE — ED PROVIDER NOTE - OBJECTIVE STATEMENT
51 yo M PMHx HTN, hypothyroidism, ulcerative colitis, SUZY on CPAP, PEs on Xarelto presents to ED c/o RUQ pain x ~2 weeks. Described as a constant aching pain, nonradiating. Was sent for Abd US yesterday- concerning for possible aortici intimal dissection. Sent here by Dr. Pool for CTA. Pt denies chest pain, SOB, dizziness, N/V/D, fever/chills. 51 yo M PMHx HTN, hypothyroidism, ulcerative colitis, SUZY on CPAP, PEs on Xarelto presents to ED c/o RUQ pain x ~2 weeks. Described as a constant aching pain, nonradiating. Was sent for Abd US yesterday- concerning for possible aortic intimal dissection. Sent here by Dr. Pool for CTA. Pt denies chest pain, SOB, dizziness, N/V/D, fever/chills.

## 2020-07-21 NOTE — ED ADULT NURSE NOTE - OBJECTIVE STATEMENT
50 year old male presents to the ED complaining of abdominal pain. Patient has been experiencing 2 weeks of right sided abdominal pain, describes burning. Patient saw his PMD and also Gastro. Patient had an ultrasound done yesterday afternoon. Patient was called this morning to come to the ED for a CTA. Ultrasound shows rule out dissection. Patient is pain free in ED. Patient resting comfortably. Denies nausea/vomiting. Denies fever/chills. Denies recent illness or sick contacts. Denies known COVID exposure.

## 2020-07-21 NOTE — ED PROVIDER NOTE - PATIENT PORTAL LINK FT
You can access the FollowMyHealth Patient Portal offered by [facility] by registering at the following website: [email or web address]. By joining [facility]’s FollowMyHealth portal, you will also be able to view your health information using other applications (apps) compatible with our system.

## 2020-07-21 NOTE — ED ADULT NURSE NOTE - CHPI ED NUR SYMPTOMS NEG
no chills/no diarrhea/no nausea/no hematuria/no vomiting/no burning urination/no dysuria/no blood in stool/no fever/no abdominal distension

## 2021-06-14 ENCOUNTER — TRANSCRIPTION ENCOUNTER (OUTPATIENT)
Age: 51
End: 2021-06-14

## 2021-08-19 NOTE — ED ADULT NURSE NOTE - ED CARDIAC HEART SOUNDS
[FreeTextEntry1] : 52 y.o female here with her mother for evaluation of post -op AUR\par pt s/p  lumbar spine surgery approx 1 week ago at [facility]- she had post -op retention and was sent home with an indwelling catheter\par she states this has happened in the past after other surgeries including another back surgery previously\par \par currently pt ambulating with a rolling walker\par nl BM\par \par  with adult children normal S1, S2 heard

## 2022-02-06 NOTE — ED ADULT NURSE NOTE - CHIEF COMPLAINT
The patient is a 47y Male complaining of see chief complaint quote. Substance abuse Substance abuse Substance abuse

## 2022-04-08 ENCOUNTER — TRANSCRIPTION ENCOUNTER (OUTPATIENT)
Age: 52
End: 2022-04-08

## 2022-04-12 ENCOUNTER — TRANSCRIPTION ENCOUNTER (OUTPATIENT)
Age: 52
End: 2022-04-12

## 2022-04-13 ENCOUNTER — OUTPATIENT (OUTPATIENT)
Dept: OUTPATIENT SERVICES | Facility: HOSPITAL | Age: 52
LOS: 1 days | End: 2022-04-13

## 2022-04-13 ENCOUNTER — APPOINTMENT (OUTPATIENT)
Dept: DISASTER EMERGENCY | Facility: HOSPITAL | Age: 52
End: 2022-04-13

## 2022-04-13 VITALS
HEART RATE: 76 BPM | DIASTOLIC BLOOD PRESSURE: 81 MMHG | TEMPERATURE: 99 F | RESPIRATION RATE: 19 BRPM | OXYGEN SATURATION: 96 % | SYSTOLIC BLOOD PRESSURE: 120 MMHG

## 2022-04-13 VITALS
SYSTOLIC BLOOD PRESSURE: 134 MMHG | WEIGHT: 197.98 LBS | RESPIRATION RATE: 20 BRPM | HEIGHT: 72 IN | OXYGEN SATURATION: 99 % | HEART RATE: 89 BPM | TEMPERATURE: 98 F | DIASTOLIC BLOOD PRESSURE: 87 MMHG

## 2022-04-13 DIAGNOSIS — U07.1 COVID-19: ICD-10-CM

## 2022-04-13 RX ORDER — BEBTELOVIMAB 87.5 MG/ML
175 INJECTION, SOLUTION INTRAVENOUS ONCE
Refills: 0 | Status: COMPLETED | OUTPATIENT
Start: 2022-04-13 | End: 2022-04-13

## 2022-04-13 RX ORDER — SODIUM CHLORIDE 9 MG/ML
250 INJECTION INTRAMUSCULAR; INTRAVENOUS; SUBCUTANEOUS
Refills: 0 | Status: DISCONTINUED | OUTPATIENT
Start: 2022-04-13 | End: 2022-04-27

## 2022-04-13 RX ADMIN — SODIUM CHLORIDE 50 MILLILITER(S): 9 INJECTION INTRAMUSCULAR; INTRAVENOUS; SUBCUTANEOUS at 08:59

## 2022-04-13 RX ADMIN — BEBTELOVIMAB 175 MILLIGRAM(S): 87.5 INJECTION, SOLUTION INTRAVENOUS at 08:50

## 2022-04-13 NOTE — MONOCLONAL ANTIBODY INFUSION - ASSESSMENT AND PLAN
This is a 52 yrs old male with h/o PE on Xarelto and HTN and recently diagnosed with Covid-19 infection who was referred for monoclonal antibody infusion by his PCP Alex Mcguire after testing positive for COVID 19 on 4/12/22.  Patient states he has been experiencing  chest congestion, sore throat, malaise,  since 4/10/22. He denies any CP, SOB, chills, numbness/tingling in b/l limbs, loss of sensation or motor function, N/V/D        PLAN:  - Injection procedure explained to patient   - Consent for monoclonal antibody injection obtained   - Risk & benefits discussed/all questions answered  - Inject Bebtelovimab 175 mg over 1 minute.   - Observe patient for one hour post administration    I have reviewed the Bebtelovimab Emergency Use Authorization (EUA) and I have provided the patient or patient's caregiver with the following information:    1. FDA has authorized emergency use Bebtelovimab, which is not an FDA-approved biological product.  2. The patient or patient's caregiver has the option to accept or refuse administration of Bebtelovimab.  3. The significant known and potential risks and benefits of Bebtelovimab and the extent to which such risks and benefits are unknown.  4. Information on available alternative treatments and risks and benefits of those alternatives.    The patient's COVID monoclonal antibody injection administration went well without any complications. The patient tolerated the treatment without any reactions. Vitals were stable throughout the injection & post-injection administration. The pt denies any CP, fevers, chills, SOB, numbness/tingling in b/l limbs, loss of sensation or motor function, N/V/D while receiving the injection. Patient denies any symptoms an hour after post injection. Vitals were taken post injection and were stable. Pt is medically stable to be discharged home. Discharge instructions were provided to the patient with a fact sheet included. Patient was instructed to self-isolate and use infection control measures (e.g wear mask, isolate, social distance, avoid sharing personal items, clean and disinfect "high touch" surfaces, and frequent handwashing according to the CDC guidelines. The patient was informed on what symptoms to be aware of for the next couple of days, and if there are any issues to call the 24/7 clinical call center. Patient was instructed to follow up with PCP as needed.

## 2022-04-13 NOTE — MONOCLONAL ANTIBODY INFUSION - HOME MEDICATIONS
PriLOSEC OTC 20 mg oral delayed release tablet , 1 tab(s) orally once a day  rivaroxaban 15 mg oral tablet , 1 tab(s) orally 2 times a day (with meals)  acetaminophen 325 mg oral tablet , 2 tab(s) orally every 6 hours, As needed, Temp greater or equal to 38C (100.4F), Mild Pain (1 - 3)  budesonide 9 mg oral tablet, extended release , 9 milligram(s) orally every other day  Lialda 1.2 g oral delayed release tablet , 2 tab(s) orally once a day  levothyroxine 50 mcg (0.05 mg) oral tablet , 1 tab(s) orally once a day  losartan-hydrochlorothiazide 100mg-12.5mg oral tablet , 1 tab(s) orally once a day

## 2022-04-13 NOTE — MONOCLONAL ANTIBODY INFUSION - EXAM
CC: Monoclonal Antibody Infusion/COVID 19 Positive  52yMale    exam/findings:  T(C): 36.9 (04-13-22 @ 08:43), Max: 36.9 (04-13-22 @ 08:43)  HR: 89 (04-13-22 @ 08:43) (89 - 89)  BP: 134/87 (04-13-22 @ 08:43) (134/87 - 134/87)  RR: 20 (04-13-22 @ 08:43) (20 - 20)  SpO2: 99% (04-13-22 @ 08:43) (99% - 99%)      PE:   Appearance: NAD	  HEENT:   Normal oral mucosa,   Lymphatic: No lymphadenopathy  Cardiovascular: Normal S1 S2, No JVD, No murmurs, No edema  Respiratory: Lungs clear to auscultation	  Gastrointestinal:  Soft, Non-tender, + BS	  Skin: warm and dry  Neurologic: Non-focal  Extremities: Normal range of motion,

## 2022-06-02 ENCOUNTER — APPOINTMENT (OUTPATIENT)
Dept: UROLOGY | Facility: CLINIC | Age: 52
End: 2022-06-02
Payer: COMMERCIAL

## 2022-06-02 VITALS
WEIGHT: 198 LBS | BODY MASS INDEX: 26.82 KG/M2 | OXYGEN SATURATION: 97 % | SYSTOLIC BLOOD PRESSURE: 151 MMHG | DIASTOLIC BLOOD PRESSURE: 93 MMHG | HEART RATE: 67 BPM | HEIGHT: 72 IN

## 2022-06-02 PROCEDURE — 99214 OFFICE O/P EST MOD 30 MIN: CPT | Mod: 25

## 2022-06-02 PROCEDURE — 51741 ELECTRO-UROFLOWMETRY FIRST: CPT

## 2022-06-02 PROCEDURE — 51798 US URINE CAPACITY MEASURE: CPT

## 2022-06-02 NOTE — LETTER BODY
[Dear  ___] : Dear  [unfilled], [Courtesy Letter:] : I had the pleasure of seeing your patient, [unfilled], in my office today. [Please see my note below.] : Please see my note below. [Sincerely,] : Sincerely, [FreeTextEntry3] : Scott Hernandez MD\par  of Urology\par [facility]\par \par Offices:\par [facility] @\par [address] [place] [address]\par and\par [address], [place] [address], [address]\par and\par [address] [address]\par \par TEL: [phone]\par FAX: [phone]

## 2022-06-02 NOTE — ASSESSMENT
[FreeTextEntry1] : Benign Prostatic Hyperplasia:\par See Uroflo and PVR. \par Discussed treatment options. Recommended Flomax. Patient agreeable. \par Explained common side effects: nasal congestion, cough, muscle pain, back pain, dizziness, orthostatic hypotension, somnolence, retrograde ejaculation, decreased libido and erectile dysfunction. \par \par Erectile dysfunction:\par Reviewed pathophysiology and differential diagnosis of erectile dysfunction with the patient. Discussed lifestyle changes. \par The patient was made aware that the current therapies for erectile dysfunction consisting of:\par Oral phosphodiesterase type 5 inhibitors like Viagra/Sildenafil and Cialis/Tadalafil. \par Vacuum erection devices. \par Intraurethral suppository. \par Intracavernous vasoactive drug injection.\par Penile prosthesis implantation.\par Risks and benefits of each were discussed. All questions were answered.\par Not interested in further management at this point. \par Will get Total and Free Testosterone. Will inform results.\par \par Return to office in 2 months or sooner if any issues: will do Uroflo/PVR.

## 2022-06-02 NOTE — PHYSICAL EXAM
[Urethral Meatus] : meatus normal [Penis Abnormality] : normal uncircumcised penis [Scrotum] : the scrotum was normal [Epididymis] : the epididymides were normal [Testes Tenderness] : no tenderness of the testes [Testes Mass (___cm)] : there were no testicular masses [Prostate Tenderness] : the prostate was not tender [No Prostate Nodules] : no prostate nodules [Prostate Size ___ (0-4)] : prostate size [unfilled] (scale: 0-4) [Normal Appearance] : normal appearance [General Appearance - In No Acute Distress] : no acute distress [Abdomen Soft] : soft [Abdomen Tenderness] : non-tender [Skin Color & Pigmentation] : normal skin color and pigmentation [FreeTextEntry1] : normal peripheral circulation  [] : no respiratory distress [Oriented To Time, Place, And Person] : oriented to person, place, and time [Normal Station and Gait] : the gait and station were normal for the patient's age

## 2022-06-02 NOTE — HISTORY OF PRESENT ILLNESS
[FreeTextEntry1] : 52 year old male presents for Enlarged Prostate. \par PCP: Dr Jane Flores \par Had been having back pain, underwent Renal and Bladder Ultrasound. Found to have enlarged prostate and significant post void residual. \par Has slower but steady stream than before, daytime frequency every 2-3 hours or so, no nocturia. \par Has post void dribbling. Denies hesitancy, straining, intermittency and sense of incomplete emptying. \par Denies dysuria, hematuria, lower abdominal or flank pain, nausea, vomiting, fever, chills or rigors. \par Rates erections: 3/5. Has difficulty attaining and maintaining erections.  \par No family history of Prostate cancer. \par \par Seen on 10/28/2019 for Gross hematuria. \par Had gross hematuria with dysuria after sexual activity and was told by PCP no infection to see Urologist. Had similar episode in May, did not seek medical attention. \par In July diagnosed with clot in lung, on Xarelto. \par Denied any difficulty with urination. \par Denied fever, chills or rigors. Has off and on right sided abdominal pain. \par No history of kidney stone, recurrent urinary tract infections. \par Non smoker. \par Has history of Ulcerative colitis on medication.

## 2022-06-04 ENCOUNTER — NON-APPOINTMENT (OUTPATIENT)
Age: 52
End: 2022-06-04

## 2022-06-11 ENCOUNTER — EMERGENCY (EMERGENCY)
Facility: HOSPITAL | Age: 52
LOS: 1 days | Discharge: HOME | End: 2022-06-11
Attending: EMERGENCY MEDICINE | Admitting: EMERGENCY MEDICINE
Payer: COMMERCIAL

## 2022-06-11 VITALS
TEMPERATURE: 98 F | HEART RATE: 74 BPM | RESPIRATION RATE: 17 BRPM | DIASTOLIC BLOOD PRESSURE: 86 MMHG | OXYGEN SATURATION: 96 % | SYSTOLIC BLOOD PRESSURE: 123 MMHG

## 2022-06-11 VITALS
HEART RATE: 96 BPM | DIASTOLIC BLOOD PRESSURE: 80 MMHG | OXYGEN SATURATION: 97 % | RESPIRATION RATE: 18 BRPM | WEIGHT: 195.99 LBS | TEMPERATURE: 100 F | SYSTOLIC BLOOD PRESSURE: 140 MMHG | HEIGHT: 72 IN

## 2022-06-11 LAB
ALBUMIN SERPL ELPH-MCNC: 3.8 G/DL — SIGNIFICANT CHANGE UP (ref 3.3–5)
ALP SERPL-CCNC: 84 U/L — SIGNIFICANT CHANGE UP (ref 40–120)
ALT FLD-CCNC: 50 U/L — SIGNIFICANT CHANGE UP (ref 12–78)
ANION GAP SERPL CALC-SCNC: 4 MMOL/L — LOW (ref 5–17)
AST SERPL-CCNC: 33 U/L — SIGNIFICANT CHANGE UP (ref 15–37)
BASOPHILS # BLD AUTO: 0.06 K/UL — SIGNIFICANT CHANGE UP (ref 0–0.2)
BASOPHILS NFR BLD AUTO: 0.7 % — SIGNIFICANT CHANGE UP (ref 0–2)
BILIRUB SERPL-MCNC: 0.4 MG/DL — SIGNIFICANT CHANGE UP (ref 0.2–1.2)
BUN SERPL-MCNC: 9 MG/DL — SIGNIFICANT CHANGE UP (ref 7–23)
CALCIUM SERPL-MCNC: 9.2 MG/DL — SIGNIFICANT CHANGE UP (ref 8.5–10.1)
CHLORIDE SERPL-SCNC: 104 MMOL/L — SIGNIFICANT CHANGE UP (ref 96–108)
CO2 SERPL-SCNC: 27 MMOL/L — SIGNIFICANT CHANGE UP (ref 22–31)
CREAT SERPL-MCNC: 1 MG/DL — SIGNIFICANT CHANGE UP (ref 0.5–1.3)
EGFR: 91 ML/MIN/1.73M2 — SIGNIFICANT CHANGE UP
EOSINOPHIL # BLD AUTO: 0.07 K/UL — SIGNIFICANT CHANGE UP (ref 0–0.5)
EOSINOPHIL NFR BLD AUTO: 0.8 % — SIGNIFICANT CHANGE UP (ref 0–6)
GLUCOSE SERPL-MCNC: 124 MG/DL — HIGH (ref 70–99)
HCT VFR BLD CALC: 40.7 % — SIGNIFICANT CHANGE UP (ref 39–50)
HGB BLD-MCNC: 13.5 G/DL — SIGNIFICANT CHANGE UP (ref 13–17)
IMM GRANULOCYTES NFR BLD AUTO: 0.3 % — SIGNIFICANT CHANGE UP (ref 0–1.5)
LYMPHOCYTES # BLD AUTO: 1.59 K/UL — SIGNIFICANT CHANGE UP (ref 1–3.3)
LYMPHOCYTES # BLD AUTO: 18.5 % — SIGNIFICANT CHANGE UP (ref 13–44)
MCHC RBC-ENTMCNC: 29 PG — SIGNIFICANT CHANGE UP (ref 27–34)
MCHC RBC-ENTMCNC: 33.2 GM/DL — SIGNIFICANT CHANGE UP (ref 32–36)
MCV RBC AUTO: 87.5 FL — SIGNIFICANT CHANGE UP (ref 80–100)
MONOCYTES # BLD AUTO: 0.74 K/UL — SIGNIFICANT CHANGE UP (ref 0–0.9)
MONOCYTES NFR BLD AUTO: 8.6 % — SIGNIFICANT CHANGE UP (ref 2–14)
NEUTROPHILS # BLD AUTO: 6.09 K/UL — SIGNIFICANT CHANGE UP (ref 1.8–7.4)
NEUTROPHILS NFR BLD AUTO: 71.1 % — SIGNIFICANT CHANGE UP (ref 43–77)
NRBC # BLD: 0 /100 WBCS — SIGNIFICANT CHANGE UP (ref 0–0)
PLATELET # BLD AUTO: 354 K/UL — SIGNIFICANT CHANGE UP (ref 150–400)
POTASSIUM SERPL-MCNC: 3.8 MMOL/L — SIGNIFICANT CHANGE UP (ref 3.5–5.3)
POTASSIUM SERPL-SCNC: 3.8 MMOL/L — SIGNIFICANT CHANGE UP (ref 3.5–5.3)
PROT SERPL-MCNC: 7.9 G/DL — SIGNIFICANT CHANGE UP (ref 6–8.3)
RAPID RVP RESULT: SIGNIFICANT CHANGE UP
RBC # BLD: 4.65 M/UL — SIGNIFICANT CHANGE UP (ref 4.2–5.8)
RBC # FLD: 12.8 % — SIGNIFICANT CHANGE UP (ref 10.3–14.5)
S PYO DNA THROAT QL NAA+PROBE: SIGNIFICANT CHANGE UP
SARS-COV-2 RNA SPEC QL NAA+PROBE: SIGNIFICANT CHANGE UP
SODIUM SERPL-SCNC: 135 MMOL/L — SIGNIFICANT CHANGE UP (ref 135–145)
WBC # BLD: 8.58 K/UL — SIGNIFICANT CHANGE UP (ref 3.8–10.5)
WBC # FLD AUTO: 8.58 K/UL — SIGNIFICANT CHANGE UP (ref 3.8–10.5)

## 2022-06-11 PROCEDURE — 87651 STREP A DNA AMP PROBE: CPT

## 2022-06-11 PROCEDURE — 71045 X-RAY EXAM CHEST 1 VIEW: CPT

## 2022-06-11 PROCEDURE — 36415 COLL VENOUS BLD VENIPUNCTURE: CPT

## 2022-06-11 PROCEDURE — 87798 DETECT AGENT NOS DNA AMP: CPT

## 2022-06-11 PROCEDURE — 99284 EMERGENCY DEPT VISIT MOD MDM: CPT

## 2022-06-11 PROCEDURE — 80053 COMPREHEN METABOLIC PANEL: CPT

## 2022-06-11 PROCEDURE — 99285 EMERGENCY DEPT VISIT HI MDM: CPT | Mod: 25

## 2022-06-11 PROCEDURE — 0225U NFCT DS DNA&RNA 21 SARSCOV2: CPT

## 2022-06-11 PROCEDURE — 71045 X-RAY EXAM CHEST 1 VIEW: CPT | Mod: 26

## 2022-06-11 PROCEDURE — 93010 ELECTROCARDIOGRAM REPORT: CPT

## 2022-06-11 PROCEDURE — 85025 COMPLETE CBC W/AUTO DIFF WBC: CPT

## 2022-06-11 PROCEDURE — 93005 ELECTROCARDIOGRAM TRACING: CPT

## 2022-06-11 RX ORDER — ACETAMINOPHEN 500 MG
975 TABLET ORAL ONCE
Refills: 0 | Status: COMPLETED | OUTPATIENT
Start: 2022-06-11 | End: 2022-06-11

## 2022-06-11 RX ORDER — SODIUM CHLORIDE 9 MG/ML
1000 INJECTION INTRAMUSCULAR; INTRAVENOUS; SUBCUTANEOUS ONCE
Refills: 0 | Status: COMPLETED | OUTPATIENT
Start: 2022-06-11 | End: 2022-06-11

## 2022-06-11 RX ADMIN — SODIUM CHLORIDE 1000 MILLILITER(S): 9 INJECTION INTRAMUSCULAR; INTRAVENOUS; SUBCUTANEOUS at 12:32

## 2022-06-11 RX ADMIN — Medication 975 MILLIGRAM(S): at 12:32

## 2022-06-11 NOTE — ED PROVIDER NOTE - OBJECTIVE STATEMENT
52 M hx PE (2.5 year ago, currently on xarelto), HTN, hypothyroid, colitis (on lyalda), SUZY on CPAP c/o feeling tired, sore throat for the past week, worse the past few days. Has been taking tylenol with mild relief, last dose last night. Was seen at an urgent care when symptoms first started, rapid covid test negative. Hx covid 2 months ago. 52 M hx PE (2.5 year ago, currently on xarelto), HTN, hypothyroid, colitis (on lyalda), SUZY on CPAP c/o feeling tired, sore throat for the past week, worse the past few days. Has been taking tylenol with mild relief, last dose last night. Was seen at an urgent care when symptoms first started, rapid covid test negative. Hx covid 2 months ago. Denies cp, sob, n/v/d, bowel/bladder complaints, sick contacts, recent travel. +cough, mild congestion 52 M hx PE (2.5 year ago, currently on xarelto), HTN, hypothyroid, colitis (on lyalda), SUZY on CPAP c/o feeling tired, sore throat for the past week, increased the past 2 days. Has been taking tylenol with mild relief, last dose last night. Was seen at an urgent care when symptoms first started, rapid covid test negative. Hx covid 2 months ago. Denies cp, sob, n/v/d, bowel/bladder complaints, sick contacts, recent travel. +cough, mild congestion

## 2022-06-11 NOTE — ED PROVIDER NOTE - NS ED ATTENDING STATEMENT MOD
This was a shared visit with the ISAURO. I reviewed and verified the documentation and independently performed the documented:

## 2022-06-11 NOTE — ED PROVIDER NOTE - NSFOLLOWUPINSTRUCTIONS_ED_ALL_ED_FT
Stay hydrated.  Tylenol for pain, fever.  Salt water gargle.  Return to the Emergency Department for worsening or concerning symptoms.      A viral respiratory infection is an illness that affects parts of the body that are used for breathing. These include the lungs, nose, and throat. It is caused by a germ called a virus.    Some examples of this kind of infection are:  •A cold.      •The flu (influenza).      •A respiratory syncytial virus (RSV) infection.        What are the causes?    This condition is caused by a virus. It spreads from person to person. You can get the virus if:  •You breathe in droplets from someone who is sick.      •You come in contact with people who are sick.      •You touch mucus or other fluid from a person who is sick.        What are the signs or symptoms?    Symptoms of this condition include:  •A stuffy or runny nose.      •A sore throat.      •A cough.      •Shortness of breath.      •Trouble breathing.      •Yellow or green fluid in the nose.      Other symptoms may include:  •A fever.      •Sweating or chills.      •Tiredness (fatigue).      •Achy muscles.      •A headache.        How is this treated?    This condition may be treated with:  •Medicines that treat viruses.      •Medicines that make it easy to breathe.      •Medicines that are sprayed into the nose.      •Acetaminophen or NSAIDs, such as ibuprofen, to treat fever.        Follow these instructions at home:    Managing pain and congestion     •Take over-the-counter and prescription medicines only as told by your doctor.    •If you have a sore throat, gargle with salt water. Do this 3–4 times a day or as needed.  •To make salt water, dissolve ½–1 tsp (3–6 g) of salt in 1 cup (237 mL) of warm water. Make sure that all the salt dissolves.        •Use nose drops made from salt water. This helps with stuffiness (congestion). It also helps soften the skin around your nose.    •Take 2 tsp (10 mL) of honey at bedtime to lessen coughing at night.  •Do not give honey to children who are younger than 1 year old.        •Drink enough fluid to keep your pee (urine) pale yellow.        General instructions   A sign telling the reader not to smoke.   •Rest as much as possible.      • Do not drink alcohol.      • Do not smoke or use any products that contain nicotine or tobacco. If you need help quitting, ask your doctor.      •Keep all follow-up visits.        How is this prevented?       Washing hands with soap and water.       A person covering her mouth and nose with a cloth while sneezing.     •Get a flu shot every year. Ask your doctor when you should get your flu shot.    • Do not let other people get your germs. If you are sick:  •Wash your hands with soap and water often. Wash your hands after you cough or sneeze. Wash hands for at least 20 seconds. If you cannot use soap and water, use hand .      •Cover your mouth when you cough. Cover your nose and mouth when you sneeze.      •Do not share cups or eating utensils.      •Clean commonly used objects often. Clean commonly touched surfaces.      •Stay home from work or school.        •Avoid contact with people who are sick during cold and flu season. This is in fall and winter.        Get help if:    •Your symptoms last for 10 days or longer.      •Your symptoms get worse over time.      •You have very bad pain in your face or forehead.      •Parts of your jaw or neck get very swollen.      •You have shortness of breath.        Get help right away if:    •You feel pain or pressure in your chest.      •You have trouble breathing.      •You faint or feel like you will faint.      •You keep vomiting and it gets worse.      •You feel confused.      These symptoms may be an emergency. Get help right away. Call your local emergency services (911 in the [place]   • Do not wait to see if the symptoms will go away.        •  Do not drive yourself to the hospital.         Summary    •A viral respiratory infection is an illness that affects parts of the body that are used for breathing.      •Examples of this illness include a cold, the flu, and a respiratory syncytial virus (RSV) infection.      •The infection can cause a runny nose, cough, sore throat, and fever.      •Follow what your doctor tells you about taking medicines, drinking lots of fluid, washing your hands, resting at home, and avoiding people who are sick.      This information is not intended to replace advice given to you by your health care provider. Make sure you discuss any questions you have with your health care provider.

## 2022-06-11 NOTE — ED PROVIDER NOTE - MUSCULOSKELETAL, MLM
range of motion is not limited, no muscle or joint tenderness Neck suppler, FROM. Range of motion is not limited, no muscle or joint tenderness

## 2022-11-15 ENCOUNTER — NON-APPOINTMENT (OUTPATIENT)
Age: 52
End: 2022-11-15

## 2022-12-12 ENCOUNTER — NON-APPOINTMENT (OUTPATIENT)
Age: 52
End: 2022-12-12

## 2022-12-12 ENCOUNTER — APPOINTMENT (OUTPATIENT)
Dept: CARDIOLOGY | Facility: CLINIC | Age: 52
End: 2022-12-12

## 2022-12-12 DIAGNOSIS — Z86.718 PERSONAL HISTORY OF OTHER VENOUS THROMBOSIS AND EMBOLISM: ICD-10-CM

## 2022-12-12 DIAGNOSIS — R51.9 HEADACHE, UNSPECIFIED: ICD-10-CM

## 2022-12-12 DIAGNOSIS — G89.29 HEADACHE, UNSPECIFIED: ICD-10-CM

## 2022-12-12 DIAGNOSIS — U07.1 COVID-19: ICD-10-CM

## 2022-12-14 NOTE — PATIENT PROFILE ADULT - NSPROCHRONICPAIN_GEN_A_NUR
[facility] URGENT CARE COURSE:    Gael was seen today for cough.    Diagnoses and all orders for this visit:    Upper respiratory tract infection, unspecified type  -     POCT SARS-COV-2 ANTIGEN/FLU ANTIGEN PANEL               Plan:  Recommend plenty of fluids, hydration orally. . Rest. Avoid undue exertion. Watch for any increasing/worsening symptoms.  Recommendation to follow with the primary care physician in next 2-10 days. If the symptoms continues or  worsen contact primary care physician or  recommend patient go to the emergency room.     Dr. Naya Contreras M.D.  [facility]  [address] [address] [place] [address]  Telephone:  [phone]         no

## 2022-12-27 ENCOUNTER — APPOINTMENT (OUTPATIENT)
Dept: UROLOGY | Facility: CLINIC | Age: 52
End: 2022-12-27

## 2023-01-10 ENCOUNTER — APPOINTMENT (OUTPATIENT)
Dept: UROLOGY | Facility: CLINIC | Age: 53
End: 2023-01-10
Payer: COMMERCIAL

## 2023-01-10 VITALS
HEIGHT: 72 IN | DIASTOLIC BLOOD PRESSURE: 106 MMHG | HEART RATE: 73 BPM | RESPIRATION RATE: 16 BRPM | WEIGHT: 198 LBS | BODY MASS INDEX: 26.82 KG/M2 | TEMPERATURE: 97.9 F | SYSTOLIC BLOOD PRESSURE: 151 MMHG

## 2023-01-10 PROCEDURE — 99214 OFFICE O/P EST MOD 30 MIN: CPT | Mod: 25

## 2023-01-10 PROCEDURE — 51798 US URINE CAPACITY MEASURE: CPT

## 2023-01-11 LAB
APPEARANCE: CLEAR
BACTERIA: NEGATIVE
BILIRUBIN URINE: NEGATIVE
BLOOD URINE: NEGATIVE
COLOR: NORMAL
GLUCOSE QUALITATIVE U: NORMAL
HYALINE CASTS: 1 /LPF
KETONES URINE: NEGATIVE
LEUKOCYTE ESTERASE URINE: NEGATIVE
MICROSCOPIC-UA: NORMAL
NITRITE URINE: NEGATIVE
PH URINE: 6.5
PROTEIN URINE: NORMAL
RED BLOOD CELLS URINE: 3 /HPF
SPECIFIC GRAVITY URINE: 1.02
SQUAMOUS EPITHELIAL CELLS: 2 /HPF
URINE CYTOLOGY: NORMAL
UROBILINOGEN URINE: NORMAL
WHITE BLOOD CELLS URINE: 1 /HPF

## 2023-01-13 LAB — BACTERIA UR CULT: NORMAL

## 2023-01-16 ENCOUNTER — NON-APPOINTMENT (OUTPATIENT)
Age: 53
End: 2023-01-16

## 2023-01-16 NOTE — LETTER BODY
[Dear  ___] : Dear  [unfilled], [Courtesy Letter:] : I had the pleasure of seeing your patient, [unfilled], in my office today. [Please see my note below.] : Please see my note below. [Sincerely,] : Sincerely, [FreeTextEntry3] : Scott Hernandez MD\par  of Urology\par [employer]\par \par [facility]\par Offices:\par [address] [place]\par [address] [address]\par [address] [facility], [address]\par \par TEL: [phone]\par FAX: [phone]

## 2023-01-16 NOTE — ASSESSMENT
[FreeTextEntry1] : Reviewed records.\par Discussed labs and imaging.\par CT scan Abdomen and Pelvis(11/1722):\par Enlarged prostate.\par \par Benign Prostatic Hyperplasia:\par PVR: 328 ml. \par Discussed treatment options, will start Flomax. \par \par Erectile dysfunction: \par Will get Total and Free Testosterone. \par Will inform results. \par \par Gross hematuria:\par Discussed the differential diagnosis of hematuria including benign and malignant pathology- including but not limited to nephrolithiasis, bladder stone, urinary tract infection, glomerular disease, renal cancer, bladder cancer, prostate cancer. We also discussed the chance that workup will not reveal a source for the bleeding. The patient understands that the hematuria could be from an upper tract (kidney or ureter) or lower tract (bladder, urethra, prostate) and that workup includes imaging and direct visualization of all of these.\par \par Will get Urinalysis, Urine culture and Urine cytology. \par Recommended Cystoscopy. Discussed the procedure, risks and benefits. Patient agreeable.\par \par Return to clinic for next available Cystoscopy: will do Uroflo/PVR.

## 2023-01-16 NOTE — HISTORY OF PRESENT ILLNESS
[FreeTextEntry1] : PCP: Dr Jane Flores.\par \par 52 year old male presents for gross hematuria. \par Had another episode of gross hematuria, saw Dr Portillo. Had CT scan and was recommended Cystoscopy.\par Not taking Flomax. Same urination. \par Did not do Testosterone. \par \par Seen on 6/2/22 for Enlarged Prostate. \par Had been having back pain, underwent Renal and Bladder Ultrasound. Found to have enlarged prostate and significant post void residual. \par Has slower but steady stream than before, daytime frequency every 2-3 hours or so, no nocturia. \par Has post void dribbling. Denies hesitancy, straining, intermittency and sense of incomplete emptying. \par Denies dysuria, hematuria, lower abdominal or flank pain, nausea, vomiting, fever, chills or rigors. \par Rates erections: 3/5. Has difficulty attaining and maintaining erections.  \par No family history of Prostate cancer. \par \par Seen on 10/28/2019 for Gross hematuria. \par Had gross hematuria with dysuria after sexual activity and was told by PCP no infection to see Urologist. Had similar episode in May, did not seek medical attention. \par In July diagnosed with clot in lung, on Xarelto. \par Denied any difficulty with urination. \par Denied fever, chills or rigors. Has off and on right sided abdominal pain. \par No history of kidney stone, recurrent urinary tract infections. \par Non smoker. \par Has history of Ulcerative colitis on medication.

## 2023-01-16 NOTE — PHYSICAL EXAM
[Normal Appearance] : normal appearance [General Appearance - In No Acute Distress] : no acute distress [Skin Color & Pigmentation] : normal skin color and pigmentation [] : no respiratory distress [Oriented To Time, Place, And Person] : oriented to person, place, and time [Normal Station and Gait] : the gait and station were normal for the patient's age

## 2023-02-07 ENCOUNTER — APPOINTMENT (OUTPATIENT)
Dept: UROLOGY | Facility: CLINIC | Age: 53
End: 2023-02-07
Payer: COMMERCIAL

## 2023-02-07 VITALS
TEMPERATURE: 97.1 F | DIASTOLIC BLOOD PRESSURE: 74 MMHG | SYSTOLIC BLOOD PRESSURE: 150 MMHG | WEIGHT: 198 LBS | BODY MASS INDEX: 26.82 KG/M2 | HEIGHT: 72 IN

## 2023-02-07 DIAGNOSIS — R31.0 GROSS HEMATURIA: ICD-10-CM

## 2023-02-07 DIAGNOSIS — R31.9 HEMATURIA, UNSPECIFIED: ICD-10-CM

## 2023-02-07 DIAGNOSIS — N52.9 MALE ERECTILE DYSFUNCTION, UNSPECIFIED: ICD-10-CM

## 2023-02-07 PROCEDURE — 99214 OFFICE O/P EST MOD 30 MIN: CPT | Mod: 25

## 2023-02-07 PROCEDURE — 52000 CYSTOURETHROSCOPY: CPT

## 2023-02-07 PROCEDURE — 51798 US URINE CAPACITY MEASURE: CPT

## 2023-02-21 PROBLEM — R31.0 GROSS HEMATURIA: Status: ACTIVE | Noted: 2019-10-28

## 2023-02-21 PROBLEM — R31.9 HEMATURIA: Noted: 2022-11-14

## 2023-02-21 NOTE — HISTORY OF PRESENT ILLNESS
[FreeTextEntry1] : PCP: Dr Jane Flores.\par \par 52 year old male presents follow up. \par Taking Flomax, flow better and less post void dribbling otherwise same urination. \par No new episode of gross hematuria. \par \par Seen on 1/10/23 for gross hematuria. \par Had another episode of gross hematuria, saw Dr Portillo. Had CT scan and was recommended Cystoscopy.\par Not taking Flomax. Same urination. \par Did not do Testosterone. \par \par Seen on 6/2/22 for Enlarged Prostate. \par Had been having back pain, underwent Renal and Bladder Ultrasound. Found to have enlarged prostate and significant post void residual. \par Has slower but steady stream than before, daytime frequency every 2-3 hours or so, no nocturia. \par Has post void dribbling. Denies hesitancy, straining, intermittency and sense of incomplete emptying. \par Denies dysuria, hematuria, lower abdominal or flank pain, nausea, vomiting, fever, chills or rigors. \par Rates erections: 3/5. Has difficulty attaining and maintaining erections.  \par No family history of Prostate cancer. \par \par Seen on 10/28/2019 for Gross hematuria. \par Had gross hematuria with dysuria after sexual activity and was told by PCP no infection to see Urologist. Had similar episode in May, did not seek medical attention. \par In July diagnosed with clot in lung, on Xarelto. \par Denied any difficulty with urination. \par Denied fever, chills or rigors. Has off and on right sided abdominal pain. \par No history of kidney stone, recurrent urinary tract infections. \par Non smoker. \par Has history of Ulcerative colitis on medication.

## 2023-02-21 NOTE — LETTER BODY
[Dear  ___] : Dear  [unfilled], [Courtesy Letter:] : I had the pleasure of seeing your patient, [unfilled], in my office today. [Please see my note below.] : Please see my note below. [Sincerely,] : Sincerely, [FreeTextEntry3] : Scott Hernandez MD\par  of Urology\par [facility]\par \par [facility]\par Offices:\par [address] [facility], [facility]\par [address] [address]\par [address] [facility], [address]\par \par TEL: [phone]\par FAX: [phone]

## 2023-02-21 NOTE — PHYSICAL EXAM
[Normal Appearance] : normal appearance [General Appearance - In No Acute Distress] : no acute distress [Abdomen Soft] : soft [Abdomen Tenderness] : non-tender [] : no respiratory distress [Oriented To Time, Place, And Person] : oriented to person, place, and time [FreeTextEntry1] : normal peripheral circulation

## 2023-02-21 NOTE — ASSESSMENT
[FreeTextEntry1] : Reviewed records.\par Discussed labs. \par Testosterone with . \par \par Gross hematuria:\par Discussed work up of hematuria so far. Recommended Cystoscopy. Discussed risks and benefits. \par Patient agreeable. Informed consent obtained. \par On Cystoscopy today:\par Bilobar prostatic enlargement with moderately enlarged lateral lobes with coaptation. \par Dilated blood vessels at bladder neck. \par Grade II trabeculations.\par \par Benign Prostatic Hyperplasia:\par PVR: 203 ml. \par Discussed treatment options, will continue Flomax. \par \par Erectile dysfunction:\par Will get Total and Free Testosterone with Luteinizing hormone, Follicle stimulating hormone and Prolactin.\par Will inform results. \par \par Return to office in 3 months or sooner if any issues: will do Uroflo/PVR.

## 2023-04-20 ENCOUNTER — NON-APPOINTMENT (OUTPATIENT)
Age: 53
End: 2023-04-20

## 2023-05-11 ENCOUNTER — APPOINTMENT (OUTPATIENT)
Dept: UROLOGY | Facility: CLINIC | Age: 53
End: 2023-05-11

## 2023-07-27 ENCOUNTER — APPOINTMENT (OUTPATIENT)
Dept: UROLOGY | Facility: CLINIC | Age: 53
End: 2023-07-27

## 2023-08-01 NOTE — H&P ADULT - GENITOURINARY
Pre Admission Screening  Bri Pickard RN      []Hide copied text  []Hover for attribution information  Anesthesia Assessment: Preoperative EQUATION     Planned Procedure: Procedure(s) (LRB):  EXCISION-MASS-NECK (Right)  MASTOIDECTOMY (Right)  Requested Anesthesia Type:General  Surgeon: Gonzalo Vanessa MD  Service: ENT  Known or anticipated Date of Surgery:2/14/2018     Surgeon notes: reviewed     Electronic QUestionnaire Assessment completed via nurse interview with patient.         NO AQ     Triage considerations:      The patient has no apparent active cardiac condition (No unstable coronary Syndrome such as severe unstable angina or recent [<1 month] myocardial infarction, decompensated CHF, severe valvular   disease or significant arrhythmia)     Previous anesthesia records:GETA, MAC, No problems and Not available     Last PCP note: no PCP  Subspecialty notes: ENT     Other important co-morbidities: none noted     Tests already available:  Available tests,  within 3 months . 11/15/17 CBC, CMP.                            Instructions given. (See in Nurse's note)     Optimization:  Anesthesia Preop Clinic Assessment  Indicated-not required for this procedure                Plan:    Testing:  none                           Patient  has previously scheduled Medical Appointment: 1/26 preop with [facility]     Navigation:              Straight Line to surgery.                          No tests, anesthesia preop clinic visit, or consult required.                                                     Electronically signed by Bri Pickard RN at 1/19/2018  9:32 AM        Pre-admit on 2/14/2018            Detailed Report                                                                                                                   01/19/2018  Alfredo Alvarez is a 37 y.o., male.    Anesthesia Evaluation    I have reviewed the Patient Summary Reports.        Review of Systems  Anesthesia Hx:  No problems with  I received a teams message from Jana Tello at kids care office. Dad brought in completed developmental peds packet . Dee Wang will fax and scan packet in his chart. I sent bronwyn a IB message and aware packet completed. I also noted that lead level completed and pending results. I will continue to follow. Well visit 6/14/23 7/7/23     Audiology needs under sedation . Evan Breaux  new referral to [facility] they only do sedated hearing screen . 11/8/23 11:10     [facility] audiology 8/29/23      Dermatology seen 5/16/23      Elevated lead level will need treatment and follow up labs labs need to be completed next week completed 7/24/23         Developmental peds completed and IB sent to [facility] .      MCG assessment completed 6/26/23 previous Anesthesia  Denies Family Hx of Anesthesia complications.   Denies Personal Hx of Anesthesia complications.   Social:  Non-Smoker    Hematology/Oncology:  Hematology Normal   Oncology Normal     EENT/Dental:  EENT/Dental Normal  Ears General/Symptom(s) Hearing Impairment: Pt is hearing impaired-S/P trochlear implant, Throat Disease: Neck mass-first brachial cleft cyst.   Cardiovascular:  Cardiovascular Normal   Denies Angina.  Functional Capacity 4.5 METS    Pulmonary:  Pulmonary Normal  Denies Shortness of breath.  Denies Recent URI.    Renal/:  Renal/ Normal     Hepatic/GI:  Hepatic/GI Normal    Musculoskeletal:  Musculoskeletal Normal    Neurological:  Neurology Normal    Endocrine:  Endocrine Normal    Dermatological:  Skin Normal    Psych:  Psychiatric Normal           Physical Exam  General:  Well nourished    Airway/Jaw/Neck:  Airway Findings: Mouth Opening: Normal Tongue: Normal  General Airway Assessment: Adult  Mallampati: II  Improves to II with phonation.  TM Distance: Normal, at least 6 cm  Jaw/Neck Findings:  Neck ROM: Normal ROM      Dental:  Dental Findings: In tact   Chest/Lungs:  Chest/Lungs Findings: Clear to auscultation, Normal Respiratory Rate     Heart/Vascular:  Heart Findings: Rate: Normal  Rhythm: Regular Rhythm  Sounds: Normal             Anesthesia Plan  Type of Anesthesia, risks & benefits discussed:  Anesthesia Type:  general  Patient's Preference: General  Intra-op Monitoring Plan:   Intra-op Monitoring Plan Comments:   Post Op Pain Control Plan:   Post Op Pain Control Plan Comments:   Induction:   IV  Beta Blocker:  Patient is not currently on a Beta-Blocker (No further documentation required).       Informed Consent: Patient understands risks and agrees with Anesthesia plan.  Questions answered. Anesthesia consent signed with patient.  ASA Score: 2     Day of Surgery Review of History & Physical: I have interviewed and examined the patient. I have reviewed the patient's  H&P dated:  There are no significant changes.          Ready For Surgery From Anesthesia Perspective.        not applicable

## 2023-08-23 ENCOUNTER — OFFICE (OUTPATIENT)
Dept: URBAN - METROPOLITAN AREA CLINIC 109 | Facility: CLINIC | Age: 53
Setting detail: OPHTHALMOLOGY
End: 2023-08-23
Payer: COMMERCIAL

## 2023-08-23 DIAGNOSIS — H00.14: ICD-10-CM

## 2023-08-23 PROCEDURE — 92014 COMPRE OPH EXAM EST PT 1/>: CPT | Performed by: OPHTHALMOLOGY

## 2023-08-23 ASSESSMENT — VISUAL ACUITY
OD_BCVA: 20/20-2
OS_BCVA: 20/20

## 2023-08-23 ASSESSMENT — REFRACTION_CURRENTRX
OS_SPHERE: -3.00
OD_OVR_VA: 20/
OD_SPHERE: -3.25
OS_OVR_VA: 20/
OS_AXIS: 88
OD_AXIS: 175
OS_CYLINDER: -0.50
OD_CYLINDER: -0.25

## 2023-08-23 ASSESSMENT — CONFRONTATIONAL VISUAL FIELD TEST (CVF)
OS_FINDINGS: FULL
OD_FINDINGS: FULL

## 2023-08-23 ASSESSMENT — TONOMETRY
OD_IOP_MMHG: 17
OS_IOP_MMHG: 17

## 2023-08-23 ASSESSMENT — REFRACTION_MANIFEST
OS_VA1: 20/20
OS_SPHERE: -3.25
OD_SPHERE: -3.00
OD_VA1: 20/20

## 2023-08-25 ENCOUNTER — APPOINTMENT (OUTPATIENT)
Dept: UROLOGY | Facility: CLINIC | Age: 53
End: 2023-08-25
Payer: COMMERCIAL

## 2023-08-25 VITALS
OXYGEN SATURATION: 98 % | DIASTOLIC BLOOD PRESSURE: 88 MMHG | HEART RATE: 67 BPM | RESPIRATION RATE: 15 BRPM | HEIGHT: 72 IN | BODY MASS INDEX: 27.09 KG/M2 | WEIGHT: 200 LBS | SYSTOLIC BLOOD PRESSURE: 129 MMHG

## 2023-08-25 PROCEDURE — 99214 OFFICE O/P EST MOD 30 MIN: CPT | Mod: 25

## 2023-08-25 PROCEDURE — 51741 ELECTRO-UROFLOWMETRY FIRST: CPT

## 2023-08-28 NOTE — ADDENDUM
[FreeTextEntry1] :  Documented by Arlin Esquivel acting as a scribe under Dr. Scott Hernandez. 8/25/2023

## 2023-08-28 NOTE — ASSESSMENT
[address]  INFORMED CONSENT FOR TRANSFUSION OF BLOOD OR BLOOD PRODUCTS   My physician has informed me of the nature, purpose, benefits and risks of transfusion for blood and blood components that he/she may deem nece (Signature of Patient)                                       (Responsible party in case of Minor,                                                                            Incompetent, or unconscious Patient)  __________________________________    _______ [FreeTextEntry1] : Reviewed records. Discussed imaging.   Benign Prostatic Hyperplasia: Reviewed ultrasound from 5-. Prostate volume was 38 CCs.  See Uroflo and PVR.  Discussed different options, including Rezum and Urolift.  Also discussed trial of Silodosin or Finasteride.  Patient will consider options, will continue with Flomax for now.  Will get bladder ultrasound before follow up appointment.   Return to office in 6 months.

## 2023-08-28 NOTE — PHYSICAL EXAM
[Normal Appearance] : normal appearance [General Appearance - In No Acute Distress] : no acute distress [Abdomen Soft] : soft [Abdomen Tenderness] : non-tender [FreeTextEntry1] : normal peripheral circulation  [] : no respiratory distress

## 2023-08-28 NOTE — END OF VISIT
[FreeTextEntry3] : Documented by Arlin Esquivel acting as a scribe for Dr. Scott Hernandez. 8/25/23   All medical record entries made by the Scribe were at my, Dr. Scott Hernandez, direction and personally dictated by me on 8/25/23. I have reviewed the chart and agree that the record accurately reflects my personal performance of the history, physical exam, assessment and plan. I have also personally directed, reviewed, and agreed with the chart. [Time Spent: ___ minutes] : I have spent [unfilled] minutes of time on the encounter.

## 2023-08-28 NOTE — HISTORY OF PRESENT ILLNESS
[FreeTextEntry1] : PCP: Dr Jane Flores.8/25/2023:  Patient here for follow-up. Same urination, taking Tamsulosin. No new episode of Gross Hematuria.  Cystoscopy (2/7/2023): Bilobar prostatic enlargement with moderately enlarged lateral lobes with coaptation.  Seen on 1/10/23 for gross hematuria.  Had another episode of gross hematuria, saw Dr Portillo. Had CT scan and was recommended Cystoscopy. Not taking Flomax. Same urination.  Did not do Testosterone.   Seen on 6/2/22 for Enlarged Prostate.  Had been having back pain, underwent Renal and Bladder Ultrasound. Found to have enlarged prostate and significant post void residual.  Has slower but steady stream than before, daytime frequency every 2-3 hours or so, no nocturia.  Has post void dribbling. Denies hesitancy, straining, intermittency and sense of incomplete emptying.  Denies dysuria, hematuria, lower abdominal or flank pain, nausea, vomiting, fever, chills or rigors.  Rates erections: 3/5. Has difficulty attaining and maintaining erections.   No family history of Prostate cancer.   Seen on 10/28/2019 for Gross hematuria.  Had gross hematuria with dysuria after sexual activity and was told by PCP no infection to see Urologist. Had similar episode in May, did not seek medical attention.  In July diagnosed with clot in lung, on Xarelto.  Denied any difficulty with urination.  Denied fever, chills or rigors. Has off and on right sided abdominal pain.  No history of kidney stone, recurrent urinary tract infections.  Non smoker.  Has history of Ulcerative colitis on medication.

## 2023-08-30 ENCOUNTER — OFFICE (OUTPATIENT)
Dept: URBAN - METROPOLITAN AREA CLINIC 109 | Facility: CLINIC | Age: 53
Setting detail: OPHTHALMOLOGY
End: 2023-08-30
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
## 2023-08-30 PROCEDURE — NO SHOW FE NO SHOW FEE: Performed by: OPHTHALMOLOGY

## 2023-10-13 ENCOUNTER — APPOINTMENT (OUTPATIENT)
Dept: CARDIOLOGY | Facility: CLINIC | Age: 53
End: 2023-10-13
Payer: COMMERCIAL

## 2023-10-13 VITALS
TEMPERATURE: 97.9 F | HEART RATE: 70 BPM | BODY MASS INDEX: 26.95 KG/M2 | DIASTOLIC BLOOD PRESSURE: 84 MMHG | OXYGEN SATURATION: 97 % | WEIGHT: 199 LBS | RESPIRATION RATE: 16 BRPM | SYSTOLIC BLOOD PRESSURE: 131 MMHG | HEIGHT: 72 IN

## 2023-10-13 DIAGNOSIS — I71.43 INFRARENAL ABDOMINAL AORTIC ANEURYSM, WITHOUT RUPTURE: ICD-10-CM

## 2023-10-13 DIAGNOSIS — Z86.711 PERSONAL HISTORY OF PULMONARY EMBOLISM: ICD-10-CM

## 2023-10-13 DIAGNOSIS — I26.99 OTHER PULMONARY EMBOLISM W/OUT ACUTE COR PULMONALE: ICD-10-CM

## 2023-10-13 PROCEDURE — 99215 OFFICE O/P EST HI 40 MIN: CPT

## 2023-10-13 RX ORDER — LOSARTAN POTASSIUM 100 MG/1
100 TABLET, FILM COATED ORAL
Refills: 0 | Status: ACTIVE | COMMUNITY

## 2023-10-13 RX ORDER — LEVOTHYROXINE SODIUM 0.05 MG/1
50 TABLET ORAL
Refills: 0 | Status: ACTIVE | COMMUNITY

## 2023-10-13 RX ORDER — TURMERIC ROOT EXTRACT 500 MG
TABLET ORAL
Refills: 0 | Status: ACTIVE | COMMUNITY

## 2023-10-13 RX ORDER — CHOLECALCIFEROL (VITAMIN D3) 125 MCG
125 MCG CAPSULE ORAL
Refills: 0 | Status: ACTIVE | COMMUNITY

## 2023-10-13 RX ORDER — FLASH GLUCOSE SCANNING READER
EACH MISCELLANEOUS
Refills: 0 | Status: ACTIVE | COMMUNITY

## 2023-10-13 RX ORDER — ASCORBIC ACID 125 MG
5000 TABLET,CHEWABLE ORAL
Refills: 0 | Status: ACTIVE | COMMUNITY

## 2023-10-13 RX ORDER — FLASH GLUCOSE SENSOR
KIT MISCELLANEOUS
Refills: 0 | Status: ACTIVE | COMMUNITY

## 2023-10-13 RX ORDER — ZINC OXIDE 13 %
CREAM (GRAM) TOPICAL
Refills: 0 | Status: ACTIVE | COMMUNITY

## 2023-10-13 RX ORDER — RIVAROXABAN 10 MG/1
10 TABLET, FILM COATED ORAL
Refills: 0 | Status: ACTIVE | COMMUNITY

## 2023-10-13 RX ORDER — MESALAMINE 1.2 G/1
1.2 TABLET, DELAYED RELEASE ORAL
Refills: 0 | Status: ACTIVE | COMMUNITY

## 2023-11-12 ENCOUNTER — NON-APPOINTMENT (OUTPATIENT)
Age: 53
End: 2023-11-12

## 2024-01-04 ENCOUNTER — APPOINTMENT (OUTPATIENT)
Dept: CARDIOLOGY | Facility: CLINIC | Age: 54
End: 2024-01-04
Payer: COMMERCIAL

## 2024-01-04 VITALS
WEIGHT: 198 LBS | BODY MASS INDEX: 26.82 KG/M2 | DIASTOLIC BLOOD PRESSURE: 83 MMHG | HEART RATE: 77 BPM | SYSTOLIC BLOOD PRESSURE: 140 MMHG | HEIGHT: 72 IN | OXYGEN SATURATION: 98 % | TEMPERATURE: 98.2 F

## 2024-01-04 DIAGNOSIS — L03.211 CELLULITIS OF FACE: ICD-10-CM

## 2024-01-04 PROCEDURE — 99213 OFFICE O/P EST LOW 20 MIN: CPT

## 2024-01-04 NOTE — PHYSICAL EXAM
[No Acute Distress] : no acute distress [Well Nourished] : well nourished [Well Developed] : well developed [Well-Appearing] : well-appearing [Normal Sclera/Conjunctiva] : normal sclera/conjunctiva [PERRL] : pupils equal round and reactive to light [EOMI] : extraocular movements intact [Normal Outer Ear/Nose] : the outer ears and nose were normal in appearance [Normal Oropharynx] : the oropharynx was normal [No JVD] : no jugular venous distention [No Lymphadenopathy] : no lymphadenopathy [Supple] : supple [Thyroid Normal, No Nodules] : the thyroid was normal and there were no nodules present [No Respiratory Distress] : no respiratory distress  [No Accessory Muscle Use] : no accessory muscle use [Clear to Auscultation] : lungs were clear to auscultation bilaterally [Normal Rate] : normal rate  [Regular Rhythm] : with a regular rhythm [Normal S1, S2] : normal S1 and S2 [No Murmur] : no murmur heard [No Carotid Bruits] : no carotid bruits [No Abdominal Bruit] : a ~M bruit was not heard ~T in the abdomen [No Varicosities] : no varicosities [Pedal Pulses Present] : the pedal pulses are present [No Edema] : there was no peripheral edema [No Palpable Aorta] : no palpable aorta [No Extremity Clubbing/Cyanosis] : no extremity clubbing/cyanosis [Soft] : abdomen soft [Non Tender] : non-tender [Non-distended] : non-distended [No Masses] : no abdominal mass palpated [No HSM] : no HSM [Normal Bowel Sounds] : normal bowel sounds [Normal Posterior Cervical Nodes] : no posterior cervical lymphadenopathy [Normal Anterior Cervical Nodes] : no anterior cervical lymphadenopathy [No CVA Tenderness] : no CVA  tenderness [No Spinal Tenderness] : no spinal tenderness [No Joint Swelling] : no joint swelling [Grossly Normal Strength/Tone] : grossly normal strength/tone [No Rash] : no rash [Coordination Grossly Intact] : coordination grossly intact [No Focal Deficits] : no focal deficits [Normal Gait] : normal gait [Deep Tendon Reflexes (DTR)] : deep tendon reflexes were 2+ and symmetric [Normal Affect] : the affect was normal [Normal Insight/Judgement] : insight and judgment were intact [de-identified] : Soreness with mild small induration in the left submandibular region probably from shaving

## 2024-01-04 NOTE — REVIEW OF SYSTEMS
[Earache] : earache [Negative] : Heme/Lymph [FreeTextEntry4] : Soreness and swelling in the left submandibular region

## 2024-01-04 NOTE — HISTORY OF PRESENT ILLNESS
[FreeTextEntry8] : 53 years old male with history of pulmonary emboli and ulcerative colitis comes to the office with complaints of mild swelling in the left submandibular region.  Patient is concerned for possible lymph glands

## 2024-01-04 NOTE — PLAN
[FreeTextEntry1] : Consideration was made to give some antibiotics for mild cellulitis however as the patient had colitis and gets diarrhea with antibiotics-patient was advised to use warm soaks.  Patient was advised to come for a follow-up in 6 weeks for further evaluation

## 2024-02-14 ENCOUNTER — NON-APPOINTMENT (OUTPATIENT)
Age: 54
End: 2024-02-14

## 2024-02-19 ENCOUNTER — NON-APPOINTMENT (OUTPATIENT)
Age: 54
End: 2024-02-19

## 2024-02-20 ENCOUNTER — OFFICE (OUTPATIENT)
Dept: URBAN - METROPOLITAN AREA CLINIC 109 | Facility: CLINIC | Age: 54
Setting detail: OPHTHALMOLOGY
End: 2024-02-20
Payer: MEDICAID

## 2024-02-20 DIAGNOSIS — H20.012: ICD-10-CM

## 2024-02-20 DIAGNOSIS — H40.013: ICD-10-CM

## 2024-02-20 DIAGNOSIS — H25.13: ICD-10-CM

## 2024-02-20 DIAGNOSIS — H11.153: ICD-10-CM

## 2024-02-20 DIAGNOSIS — R94.6: ICD-10-CM

## 2024-02-20 DIAGNOSIS — H16.221: ICD-10-CM

## 2024-02-20 DIAGNOSIS — D31.32: ICD-10-CM

## 2024-02-20 PROCEDURE — 99213 OFFICE O/P EST LOW 20 MIN: CPT | Performed by: OPHTHALMOLOGY

## 2024-02-20 ASSESSMENT — REFRACTION_CURRENTRX
OS_AXIS: 88
OS_CYLINDER: -0.50
OD_OVR_VA: 20/
OD_AXIS: 175
OD_CYLINDER: -0.25
OS_SPHERE: -3.00
OD_SPHERE: -3.25
OS_OVR_VA: 20/

## 2024-02-20 ASSESSMENT — CONFRONTATIONAL VISUAL FIELD TEST (CVF)
OD_FINDINGS: FULL
OS_FINDINGS: FULL

## 2024-02-20 ASSESSMENT — REFRACTION_MANIFEST
OD_VA1: 20/20
OS_VA1: 20/20
OD_SPHERE: -3.00
OS_SPHERE: -3.25

## 2024-02-22 ENCOUNTER — RX ONLY (RX ONLY)
Age: 54
End: 2024-02-22

## 2024-02-22 ENCOUNTER — OFFICE (OUTPATIENT)
Dept: URBAN - METROPOLITAN AREA CLINIC 109 | Facility: CLINIC | Age: 54
Setting detail: OPHTHALMOLOGY
End: 2024-02-22
Payer: MEDICAID

## 2024-02-22 DIAGNOSIS — R94.6: ICD-10-CM

## 2024-02-22 DIAGNOSIS — D31.32: ICD-10-CM

## 2024-02-22 DIAGNOSIS — H20.012: ICD-10-CM

## 2024-02-22 DIAGNOSIS — H11.153: ICD-10-CM

## 2024-02-22 DIAGNOSIS — H25.13: ICD-10-CM

## 2024-02-22 DIAGNOSIS — H16.221: ICD-10-CM

## 2024-02-22 DIAGNOSIS — H40.013: ICD-10-CM

## 2024-02-22 PROCEDURE — 99213 OFFICE O/P EST LOW 20 MIN: CPT | Performed by: OPHTHALMOLOGY

## 2024-02-22 ASSESSMENT — REFRACTION_MANIFEST
OD_VA1: 20/20
OD_SPHERE: -3.00
OS_SPHERE: -3.25
OS_VA1: 20/20

## 2024-02-22 ASSESSMENT — REFRACTION_CURRENTRX
OD_SPHERE: -3.25
OD_CYLINDER: -0.25
OS_SPHERE: -3.00
OS_CYLINDER: -0.50
OD_OVR_VA: 20/
OS_OVR_VA: 20/
OD_AXIS: 175
OS_AXIS: 88

## 2024-02-22 ASSESSMENT — CONFRONTATIONAL VISUAL FIELD TEST (CVF)
OD_FINDINGS: FULL
OS_FINDINGS: FULL

## 2024-02-27 ENCOUNTER — RX ONLY (RX ONLY)
Age: 54
End: 2024-02-27

## 2024-02-27 ENCOUNTER — OFFICE (OUTPATIENT)
Dept: URBAN - METROPOLITAN AREA CLINIC 109 | Facility: CLINIC | Age: 54
Setting detail: OPHTHALMOLOGY
End: 2024-02-27
Payer: MEDICAID

## 2024-02-27 DIAGNOSIS — H40.013: ICD-10-CM

## 2024-02-27 DIAGNOSIS — R94.6: ICD-10-CM

## 2024-02-27 DIAGNOSIS — H25.13: ICD-10-CM

## 2024-02-27 DIAGNOSIS — H20.012: ICD-10-CM

## 2024-02-27 DIAGNOSIS — H16.221: ICD-10-CM

## 2024-02-27 DIAGNOSIS — H11.153: ICD-10-CM

## 2024-02-27 DIAGNOSIS — D31.32: ICD-10-CM

## 2024-02-27 PROCEDURE — 99213 OFFICE O/P EST LOW 20 MIN: CPT | Performed by: OPHTHALMOLOGY

## 2024-02-27 ASSESSMENT — REFRACTION_CURRENTRX
OS_CYLINDER: -0.50
OS_AXIS: 88
OD_CYLINDER: -0.25
OS_OVR_VA: 20/
OD_AXIS: 175
OD_OVR_VA: 20/
OD_SPHERE: -3.25
OS_SPHERE: -3.00

## 2024-02-27 ASSESSMENT — CONFRONTATIONAL VISUAL FIELD TEST (CVF)
OS_FINDINGS: FULL
OD_FINDINGS: FULL

## 2024-02-27 ASSESSMENT — REFRACTION_MANIFEST
OS_SPHERE: -3.25
OS_VA1: 20/20
OD_SPHERE: -3.00
OD_VA1: 20/20

## 2024-03-12 ENCOUNTER — OFFICE (OUTPATIENT)
Dept: URBAN - METROPOLITAN AREA CLINIC 109 | Facility: CLINIC | Age: 54
Setting detail: OPHTHALMOLOGY
End: 2024-03-12
Payer: MEDICAID

## 2024-03-12 VITALS — HEIGHT: 60 IN

## 2024-03-12 DIAGNOSIS — R94.6: ICD-10-CM

## 2024-03-12 DIAGNOSIS — H16.221: ICD-10-CM

## 2024-03-12 DIAGNOSIS — H20.012: ICD-10-CM

## 2024-03-12 DIAGNOSIS — H11.153: ICD-10-CM

## 2024-03-12 DIAGNOSIS — H40.013: ICD-10-CM

## 2024-03-12 DIAGNOSIS — D31.32: ICD-10-CM

## 2024-03-12 DIAGNOSIS — H25.13: ICD-10-CM

## 2024-03-12 PROCEDURE — 99213 OFFICE O/P EST LOW 20 MIN: CPT | Performed by: OPHTHALMOLOGY

## 2024-03-12 ASSESSMENT — REFRACTION_MANIFEST
OS_VA1: 20/20
OD_SPHERE: -3.00
OS_SPHERE: -3.25
OD_VA1: 20/20

## 2024-03-12 ASSESSMENT — REFRACTION_CURRENTRX
OD_CYLINDER: -0.25
OS_OVR_VA: 20/
OS_SPHERE: -3.00
OS_AXIS: 88
OS_CYLINDER: -0.50
OD_SPHERE: -3.25
OD_AXIS: 175
OD_OVR_VA: 20/

## 2024-03-28 ENCOUNTER — APPOINTMENT (OUTPATIENT)
Dept: CARDIOLOGY | Facility: CLINIC | Age: 54
End: 2024-03-28
Payer: COMMERCIAL

## 2024-03-28 ENCOUNTER — APPOINTMENT (OUTPATIENT)
Dept: CARDIOLOGY | Facility: CLINIC | Age: 54
End: 2024-03-28

## 2024-03-28 ENCOUNTER — NON-APPOINTMENT (OUTPATIENT)
Age: 54
End: 2024-03-28

## 2024-03-28 VITALS
BODY MASS INDEX: 26.82 KG/M2 | HEART RATE: 72 BPM | SYSTOLIC BLOOD PRESSURE: 138 MMHG | RESPIRATION RATE: 16 BRPM | OXYGEN SATURATION: 99 % | TEMPERATURE: 98.2 F | DIASTOLIC BLOOD PRESSURE: 84 MMHG | HEIGHT: 72 IN | WEIGHT: 198 LBS

## 2024-03-28 DIAGNOSIS — R73.9 HYPERGLYCEMIA, UNSPECIFIED: ICD-10-CM

## 2024-03-28 DIAGNOSIS — Z00.00 ENCOUNTER FOR GENERAL ADULT MEDICAL EXAMINATION W/OUT ABNORMAL FINDINGS: ICD-10-CM

## 2024-03-28 DIAGNOSIS — R00.2 PALPITATIONS: ICD-10-CM

## 2024-03-28 DIAGNOSIS — R07.2 PRECORDIAL PAIN: ICD-10-CM

## 2024-03-28 PROCEDURE — 99396 PREV VISIT EST AGE 40-64: CPT

## 2024-03-28 PROCEDURE — 93000 ELECTROCARDIOGRAM COMPLETE: CPT

## 2024-03-28 NOTE — PHYSICAL EXAM
[No Acute Distress] : no acute distress [Well Developed] : well developed [Well Nourished] : well nourished [Well-Appearing] : well-appearing [Normal Sclera/Conjunctiva] : normal sclera/conjunctiva [EOMI] : extraocular movements intact [PERRL] : pupils equal round and reactive to light [Normal Outer Ear/Nose] : the outer ears and nose were normal in appearance [Normal Oropharynx] : the oropharynx was normal [No JVD] : no jugular venous distention [No Lymphadenopathy] : no lymphadenopathy [Supple] : supple [Thyroid Normal, No Nodules] : the thyroid was normal and there were no nodules present [No Respiratory Distress] : no respiratory distress  [Clear to Auscultation] : lungs were clear to auscultation bilaterally [No Accessory Muscle Use] : no accessory muscle use [Regular Rhythm] : with a regular rhythm [Normal Rate] : normal rate  [Normal S1, S2] : normal S1 and S2 [No Murmur] : no murmur heard [No Abdominal Bruit] : a ~M bruit was not heard ~T in the abdomen [No Carotid Bruits] : no carotid bruits [No Varicosities] : no varicosities [Pedal Pulses Present] : the pedal pulses are present [No Palpable Aorta] : no palpable aorta [No Edema] : there was no peripheral edema [No Extremity Clubbing/Cyanosis] : no extremity clubbing/cyanosis [Soft] : abdomen soft [Non Tender] : non-tender [Non-distended] : non-distended [No Masses] : no abdominal mass palpated [No HSM] : no HSM [Normal Posterior Cervical Nodes] : no posterior cervical lymphadenopathy [Normal Bowel Sounds] : normal bowel sounds [Normal Anterior Cervical Nodes] : no anterior cervical lymphadenopathy [No CVA Tenderness] : no CVA  tenderness [No Joint Swelling] : no joint swelling [No Spinal Tenderness] : no spinal tenderness [Grossly Normal Strength/Tone] : grossly normal strength/tone [No Rash] : no rash [Coordination Grossly Intact] : coordination grossly intact [No Focal Deficits] : no focal deficits [Normal Gait] : normal gait [Deep Tendon Reflexes (DTR)] : deep tendon reflexes were 2+ and symmetric [Normal Affect] : the affect was normal [Normal Insight/Judgement] : insight and judgment were intact

## 2024-03-28 NOTE — PLAN
[FreeTextEntry1] : Patient was advised for complete blood work.  Patient was advised for coronary CTA for further evaluation of his cardiac status

## 2024-03-28 NOTE — HISTORY OF PRESENT ILLNESS
[FreeTextEntry1] : For comprehensive physical [de-identified] : 54 years old male with hypertension, ulcerative colitis and history of pulmonary emboli comes to the office for comprehensive physical.  In addition patient complains of left precordial discomfort off-and-on fh-mother has had atrial fibrillation and coronary artery disease

## 2024-04-12 ENCOUNTER — APPOINTMENT (OUTPATIENT)
Dept: CARDIOLOGY | Facility: CLINIC | Age: 54
End: 2024-04-12
Payer: COMMERCIAL

## 2024-04-12 ENCOUNTER — NON-APPOINTMENT (OUTPATIENT)
Age: 54
End: 2024-04-12

## 2024-04-12 ENCOUNTER — APPOINTMENT (OUTPATIENT)
Dept: UROLOGY | Facility: CLINIC | Age: 54
End: 2024-04-12
Payer: COMMERCIAL

## 2024-04-12 VITALS
HEART RATE: 72 BPM | SYSTOLIC BLOOD PRESSURE: 130 MMHG | TEMPERATURE: 98.2 F | OXYGEN SATURATION: 95 % | BODY MASS INDEX: 28.36 KG/M2 | WEIGHT: 209.4 LBS | DIASTOLIC BLOOD PRESSURE: 90 MMHG | HEIGHT: 72 IN

## 2024-04-12 VITALS
DIASTOLIC BLOOD PRESSURE: 73 MMHG | BODY MASS INDEX: 28.31 KG/M2 | HEIGHT: 72 IN | WEIGHT: 209 LBS | SYSTOLIC BLOOD PRESSURE: 144 MMHG

## 2024-04-12 DIAGNOSIS — I10 ESSENTIAL (PRIMARY) HYPERTENSION: ICD-10-CM

## 2024-04-12 DIAGNOSIS — N13.8 BENIGN PROSTATIC HYPERPLASIA WITH LOWER URINARY TRACT SYMPMS: ICD-10-CM

## 2024-04-12 DIAGNOSIS — R33.9 RETENTION OF URINE, UNSPECIFIED: ICD-10-CM

## 2024-04-12 DIAGNOSIS — E29.1 TESTICULAR HYPOFUNCTION: ICD-10-CM

## 2024-04-12 DIAGNOSIS — K51.90 ULCERATIVE COLITIS, UNSPECIFIED, W/OUT COMPLICATIONS: ICD-10-CM

## 2024-04-12 DIAGNOSIS — R59.0 LOCALIZED ENLARGED LYMPH NODES: ICD-10-CM

## 2024-04-12 DIAGNOSIS — I26.99 OTHER PULMONARY EMBOLISM W/OUT ACUTE COR PULMONALE: ICD-10-CM

## 2024-04-12 DIAGNOSIS — N40.1 BENIGN PROSTATIC HYPERPLASIA WITH LOWER URINARY TRACT SYMPMS: ICD-10-CM

## 2024-04-12 PROCEDURE — 99213 OFFICE O/P EST LOW 20 MIN: CPT

## 2024-04-12 PROCEDURE — 99214 OFFICE O/P EST MOD 30 MIN: CPT

## 2024-04-12 RX ORDER — TAMSULOSIN HYDROCHLORIDE 0.4 MG/1
0.4 CAPSULE ORAL
Qty: 90 | Refills: 3 | Status: ACTIVE | COMMUNITY
Start: 2022-06-02 | End: 1900-01-01

## 2024-04-12 NOTE — DATA REVIEWED
[FreeTextEntry1] : Patient is noted to have mild sore in the left submandibular region with submandibular lymphadenopathy.  Patient was advised to wait to see if it resolves with resolution of soreness in the left submandibular region.  Patient was also advised for ultrasound of the left neck to see the severity of submandibular lymphadenopathy

## 2024-04-12 NOTE — END OF VISIT
[Time Spent: ___ minutes] : I have spent [unfilled] minutes of time on the encounter. [FreeTextEntry3] : Documented by Arlin Esquivel acting as a scribe for Dr. Scott Hernandez. 8/25/23   All medical record entries made by the Scribe were at my, Dr. Scott Hernandez, direction and personally dictated by me on 8/25/23. I have reviewed the chart and agree that the record accurately reflects my personal performance of the history, physical exam, assessment and plan. I have also personally directed, reviewed, and agreed with the chart.

## 2024-04-12 NOTE — HISTORY OF PRESENT ILLNESS
[FreeTextEntry1] : Left sub  mandibular lymphadenopathy [de-identified] : 54 years old male with history of pulmonary emboli and ulcerative colitis comes to the office with complaints of left submandibular lymphadenopathy that is mildly painful

## 2024-04-13 NOTE — PHYSICAL EXAM
[Normal Appearance] : normal appearance [General Appearance - In No Acute Distress] : no acute distress [Abdomen Soft] : soft [Abdomen Tenderness] : non-tender [] : no respiratory distress [Normal Station and Gait] : the gait and station were normal for the patient's age [Oriented To Time, Place, And Person] : oriented to person, place, and time [FreeTextEntry1] : normal peripheral circulation

## 2024-04-13 NOTE — HISTORY OF PRESENT ILLNESS
[FreeTextEntry1] : Primary care physician: Dr Yrn Wilkins.   54-year-old male presents for follow-up.  Taking Flomax, has slow steady stream, daytime frequency 3 x and no nocturia. Has sense of incomplete emptying and sometimes has to go within the hour. Denies hesitancy, straining, intermittency, urgency or incontinence. Denies dysuria, hematuria, lower abdominal or flank pain, nausea, vomiting, fever, chills or rigors.  Morning erections are fewer.  Otherwise, no issues with erection. Normal libido does not feel fatigued tired or lethargic. Has antegrade ejaculation.  CT angiography (11/2/2023): Kidneys: Unremarkable Ureters: Unremarkable Urinary bladder: Unremarkable Reproductive organs: Unremarkable  Cystoscopy (2/7/2023): Bi lobar prostatic enlargement with moderately enlarged lateral lobes with coaptation. Prostate measures 4.5 cm transversely with mild mass effect on urinary bladder base. Bladder: Within normal limits.  CT urogram (11/17/2022): Kidneys: Unremarkable.  No calcified renal stones or hydronephrosis.  Renal and bladder ultrasound (5/31/2022): No concerning renal mass or calculi or hydronephrosis. Pre void volume: 607 mL. Post void volume: 307 mL. Prostate volume: 38 cc.  Seen on 1/10/23 for gross hematuria.  Had another episode of gross hematuria, saw Dr Portillo. Had CT scan and was recommended Cystoscopy.  Seen on 6/2/22 for Enlarged Prostate.  Had been having back pain, underwent Renal and Bladder Ultrasound. Found to have enlarged prostate and significant post void residual.   Seen on 10/28/2019 for Gross hematuria.  Had gross hematuria with dysuria after sexual activity and was told by PCP no infection to see Urologist. Had similar episode in May, did not seek medical attention.  In July diagnosed with clot in lung, on Xarelto.  Denied any difficulty with urination.  Denied fever, chills or rigors. Has off and on right sided abdominal pain.  No history of kidney stone, recurrent urinary tract infections.  Nonsmoker.  Has history of Ulcerative colitis on medication.  [Hematuria - Microscopic] : no microscopic hematuria

## 2024-04-13 NOTE — ASSESSMENT
[FreeTextEntry1] : Reviewed records. Discussed labs and imaging.   10/13/2023: Creatinine: 0.94 Testosterone: 264  Benign Prostatic Hyperplasia: Incomplete bladder emptying: Discussed treatment options, will continue Flomax.  Will get Renal and Bladder Ultrasound.   Hypogonadism: Discussed risks and benefits of Testosterone replacement therapy including effect on spermatogenesis.  Will consider his options. Will get Total and Free Testosterone with Luteinizing hormone, Follicle stimulating hormone and Prolactin.   Will inform results.  Return to office in 1 year or sooner if any issues: will do uroflow and check post void residual.

## 2024-04-15 LAB
ALBUMIN SERPL ELPH-MCNC: 4.5 G/DL
ALP BLD-CCNC: 86 U/L
ALT SERPL-CCNC: 47 U/L
ANION GAP SERPL CALC-SCNC: 12 MMOL/L
AST SERPL-CCNC: 28 U/L
BILIRUB SERPL-MCNC: 0.4 MG/DL
BUN SERPL-MCNC: 10 MG/DL
CALCIUM SERPL-MCNC: 9.8 MG/DL
CHLORIDE SERPL-SCNC: 104 MMOL/L
CHOLEST SERPL-MCNC: 207 MG/DL
CO2 SERPL-SCNC: 23 MMOL/L
CREAT SERPL-MCNC: 0.97 MG/DL
EGFR: 93 ML/MIN/1.73M2
ESTIMATED AVERAGE GLUCOSE: 157 MG/DL
GLUCOSE SERPL-MCNC: 122 MG/DL
HBA1C MFR BLD HPLC: 7.1 %
HCT VFR BLD CALC: 42.6 %
HDLC SERPL-MCNC: 53 MG/DL
HGB BLD-MCNC: 14.1 G/DL
LDLC SERPL CALC-MCNC: 132 MG/DL
MCHC RBC-ENTMCNC: 30.1 PG
MCHC RBC-ENTMCNC: 33.1 GM/DL
MCV RBC AUTO: 90.8 FL
NONHDLC SERPL-MCNC: 154 MG/DL
PLATELET # BLD AUTO: 397 K/UL
POTASSIUM SERPL-SCNC: 4.5 MMOL/L
PROT SERPL-MCNC: 7.3 G/DL
PSA SERPL-MCNC: 0.38 NG/ML
RBC # BLD: 4.69 M/UL
RBC # FLD: 13.1 %
SODIUM SERPL-SCNC: 139 MMOL/L
T4 SERPL-MCNC: 6.9 UG/DL
TRIGL SERPL-MCNC: 125 MG/DL
TSH SERPL-ACNC: 4.26 UIU/ML
WBC # FLD AUTO: 8.08 K/UL

## 2024-04-19 ENCOUNTER — TRANSCRIPTION ENCOUNTER (OUTPATIENT)
Age: 54
End: 2024-04-19

## 2024-04-19 LAB
FSH SERPL-MCNC: 11.8 IU/L
LH SERPL-ACNC: 6.5 IU/L
PROLACTIN SERPL-MCNC: 8.1 NG/ML
TESTOST FREE SERPL-MCNC: 7.1 PG/ML
TESTOST SERPL-MCNC: 354 NG/DL

## 2024-05-03 ENCOUNTER — OFFICE (OUTPATIENT)
Dept: URBAN - METROPOLITAN AREA CLINIC 109 | Facility: CLINIC | Age: 54
Setting detail: OPHTHALMOLOGY
End: 2024-05-03
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
## 2024-05-03 PROCEDURE — NO SHOW FE NO SHOW FEE: Performed by: OPHTHALMOLOGY

## 2024-05-06 ENCOUNTER — OFFICE (OUTPATIENT)
Dept: URBAN - METROPOLITAN AREA CLINIC 109 | Facility: CLINIC | Age: 54
Setting detail: OPHTHALMOLOGY
End: 2024-05-06
Payer: COMMERCIAL

## 2024-05-06 ENCOUNTER — RX ONLY (RX ONLY)
Age: 54
End: 2024-05-06

## 2024-05-06 DIAGNOSIS — H20.012: ICD-10-CM

## 2024-05-06 PROCEDURE — 99213 OFFICE O/P EST LOW 20 MIN: CPT | Performed by: OPHTHALMOLOGY

## 2024-05-06 ASSESSMENT — CONFRONTATIONAL VISUAL FIELD TEST (CVF)
OS_FINDINGS: FULL
OD_FINDINGS: FULL

## 2024-05-10 ENCOUNTER — OFFICE (OUTPATIENT)
Dept: URBAN - METROPOLITAN AREA CLINIC 109 | Facility: CLINIC | Age: 54
Setting detail: OPHTHALMOLOGY
End: 2024-05-10
Payer: COMMERCIAL

## 2024-05-10 ENCOUNTER — RX ONLY (RX ONLY)
Age: 54
End: 2024-05-10

## 2024-05-10 DIAGNOSIS — H20.012: ICD-10-CM

## 2024-05-10 PROCEDURE — 99213 OFFICE O/P EST LOW 20 MIN: CPT | Performed by: OPHTHALMOLOGY

## 2024-05-10 ASSESSMENT — CONFRONTATIONAL VISUAL FIELD TEST (CVF)
OS_FINDINGS: FULL
OD_FINDINGS: FULL

## 2024-05-24 ENCOUNTER — OFFICE (OUTPATIENT)
Dept: URBAN - METROPOLITAN AREA CLINIC 109 | Facility: CLINIC | Age: 54
Setting detail: OPHTHALMOLOGY
End: 2024-05-24
Payer: COMMERCIAL

## 2024-05-24 DIAGNOSIS — H20.012: ICD-10-CM

## 2024-05-24 DIAGNOSIS — H40.013: ICD-10-CM

## 2024-05-24 PROCEDURE — 99213 OFFICE O/P EST LOW 20 MIN: CPT | Performed by: OPHTHALMOLOGY

## 2024-05-24 ASSESSMENT — CONFRONTATIONAL VISUAL FIELD TEST (CVF)
OD_FINDINGS: FULL
OS_FINDINGS: FULL

## 2024-05-29 NOTE — ED PROVIDER NOTE - NS ED MD DISPO DISCHARGE
Detail Level: Generalized
Products Recommended: Neutrogena Ultra Sheer, Neutrogena Dry Touch, Elta MD
General Sunscreen Counseling: I recommended a broad spectrum sunscreen with a SPF of 30 or higher.  I explained that SPF 30 sunscreens block approximately 97 percent of the sun's harmful rays.  Sunscreens should be applied at least 15 minutes prior to expected sun exposure and then every 2 hours after that as long as sun exposure continues. If swimming or exercising sunscreen should be reapplied every 45 minutes to an hour after getting wet or sweating.  One ounce, or the equivalent of a shot glass full of sunscreen, is adequate to protect the skin not covered by a bathing suit. I also recommended a lip balm with a sunscreen as well. Sun protective clothing can be used in lieu of sunscreen but must be worn the entire time you are exposed to the sun's rays.
Home

## 2024-05-31 ENCOUNTER — NON-APPOINTMENT (OUTPATIENT)
Age: 54
End: 2024-05-31

## 2024-06-19 ENCOUNTER — NON-APPOINTMENT (OUTPATIENT)
Age: 54
End: 2024-06-19

## 2024-07-05 ENCOUNTER — APPOINTMENT (OUTPATIENT)
Dept: CT IMAGING | Facility: CLINIC | Age: 54
End: 2024-07-05

## 2024-09-10 RX ORDER — LOSARTAN POTASSIUM 100 MG/1
100 TABLET, FILM COATED ORAL
Qty: 90 | Refills: 0 | Status: ACTIVE | COMMUNITY
Start: 2024-09-10 | End: 1900-01-01

## 2024-09-16 ENCOUNTER — OFFICE (OUTPATIENT)
Dept: URBAN - METROPOLITAN AREA CLINIC 109 | Facility: CLINIC | Age: 54
Setting detail: OPHTHALMOLOGY
End: 2024-09-16
Payer: COMMERCIAL

## 2024-09-16 DIAGNOSIS — H40.013: ICD-10-CM

## 2024-09-16 DIAGNOSIS — H20.012: ICD-10-CM

## 2024-09-16 PROCEDURE — 99213 OFFICE O/P EST LOW 20 MIN: CPT | Performed by: OPHTHALMOLOGY

## 2024-09-16 ASSESSMENT — CONFRONTATIONAL VISUAL FIELD TEST (CVF)
OS_FINDINGS: FULL
OD_FINDINGS: FULL

## 2024-09-19 ENCOUNTER — OFFICE (OUTPATIENT)
Dept: URBAN - METROPOLITAN AREA CLINIC 109 | Facility: CLINIC | Age: 54
Setting detail: OPHTHALMOLOGY
End: 2024-09-19
Payer: COMMERCIAL

## 2024-09-19 DIAGNOSIS — H20.012: ICD-10-CM

## 2024-09-19 PROCEDURE — 99213 OFFICE O/P EST LOW 20 MIN: CPT | Performed by: OPHTHALMOLOGY

## 2024-10-07 ENCOUNTER — OFFICE (OUTPATIENT)
Dept: URBAN - METROPOLITAN AREA CLINIC 109 | Facility: CLINIC | Age: 54
Setting detail: OPHTHALMOLOGY
End: 2024-10-07
Payer: COMMERCIAL

## 2024-10-07 DIAGNOSIS — H20.012: ICD-10-CM

## 2024-10-07 PROCEDURE — 99213 OFFICE O/P EST LOW 20 MIN: CPT | Performed by: OPHTHALMOLOGY

## 2024-10-07 ASSESSMENT — REFRACTION_CURRENTRX
OS_SPHERE: -3.00
OS_OVR_VA: 20/
OS_CYLINDER: -0.50
OD_OVR_VA: 20/
OD_SPHERE: -3.25
OD_AXIS: 175
OS_AXIS: 88
OD_CYLINDER: -0.25

## 2024-10-07 ASSESSMENT — REFRACTION_MANIFEST
OS_VA1: 20/20
OS_SPHERE: -3.25
OD_VA1: 20/20
OD_SPHERE: -3.00

## 2024-10-07 ASSESSMENT — CONFRONTATIONAL VISUAL FIELD TEST (CVF)
OS_FINDINGS: FULL
OD_FINDINGS: FULL

## 2024-10-07 ASSESSMENT — TONOMETRY
OS_IOP_MMHG: 12
OD_IOP_MMHG: 12

## 2024-10-07 ASSESSMENT — VISUAL ACUITY
OS_BCVA: 20/20
OD_BCVA: 20/20

## 2024-10-07 ASSESSMENT — REFRACTION_AUTOREFRACTION
OD_CYLINDER: -0.75
OD_AXIS: 119
OS_AXIS: 087
OD_SPHERE: -2.25
OS_CYLINDER: -0.75
OS_SPHERE: -2.00

## 2024-11-11 ENCOUNTER — OFFICE (OUTPATIENT)
Dept: URBAN - METROPOLITAN AREA CLINIC 109 | Facility: CLINIC | Age: 54
Setting detail: OPHTHALMOLOGY
End: 2024-11-11
Payer: COMMERCIAL

## 2024-11-11 VITALS — HEIGHT: 60 IN

## 2024-11-11 DIAGNOSIS — H20.012: ICD-10-CM

## 2024-11-11 PROCEDURE — 99213 OFFICE O/P EST LOW 20 MIN: CPT | Performed by: OPHTHALMOLOGY

## 2024-11-11 ASSESSMENT — CONFRONTATIONAL VISUAL FIELD TEST (CVF)
OS_FINDINGS: FULL
OD_FINDINGS: FULL

## 2024-11-11 ASSESSMENT — REFRACTION_MANIFEST
OD_VA1: 20/20
OS_SPHERE: -3.25
OS_VA1: 20/20
OD_SPHERE: -3.00

## 2024-11-11 ASSESSMENT — REFRACTION_CURRENTRX
OS_OVR_VA: 20/
OS_CYLINDER: -0.50
OS_SPHERE: -3.00
OS_AXIS: 88
OD_AXIS: 175
OD_SPHERE: -3.25
OD_CYLINDER: -0.25
OD_OVR_VA: 20/

## 2024-11-11 ASSESSMENT — REFRACTION_AUTOREFRACTION
OD_CYLINDER: -0.75
OD_SPHERE: -2.25
OS_CYLINDER: -0.75
OS_AXIS: 087
OS_SPHERE: -2.00
OD_AXIS: 119

## 2024-11-11 ASSESSMENT — VISUAL ACUITY
OS_BCVA: 20/20
OD_BCVA: 20/20

## 2024-11-11 ASSESSMENT — TONOMETRY
OD_IOP_MMHG: 10
OS_IOP_MMHG: 08

## 2024-11-12 ENCOUNTER — RX ONLY (RX ONLY)
Age: 54
End: 2024-11-12

## 2024-11-12 ENCOUNTER — OFFICE (OUTPATIENT)
Dept: URBAN - METROPOLITAN AREA CLINIC 12 | Facility: CLINIC | Age: 54
Setting detail: OPHTHALMOLOGY
End: 2024-11-12
Payer: COMMERCIAL

## 2024-11-12 DIAGNOSIS — H20.012: ICD-10-CM

## 2024-11-12 PROCEDURE — 92012 INTRM OPH EXAM EST PATIENT: CPT | Performed by: OPHTHALMOLOGY

## 2024-11-12 ASSESSMENT — REFRACTION_AUTOREFRACTION
OS_SPHERE: -2.00
OS_CYLINDER: -0.50
OD_CYLINDER: -0.50
OD_AXIS: 122
OD_SPHERE: -2.25
OS_AXIS: 090

## 2024-11-12 ASSESSMENT — TONOMETRY
OS_IOP_MMHG: 10
OD_IOP_MMHG: 11

## 2024-11-12 ASSESSMENT — KERATOMETRY
OD_K1POWER_DIOPTERS: 42.00
OD_AXISANGLE_DEGREES: 070
OD_K2POWER_DIOPTERS: 42.50
OS_K1POWER_DIOPTERS: 42.25
OS_K2POWER_DIOPTERS: 42.50
OS_AXISANGLE_DEGREES: 106

## 2024-11-12 ASSESSMENT — VISUAL ACUITY
OS_BCVA: 20/20
OD_BCVA: 20/20-2

## 2024-11-12 ASSESSMENT — REFRACTION_CURRENTRX
OS_CYLINDER: -0.50
OS_AXIS: 88
OD_SPHERE: -3.25
OS_OVR_VA: 20/
OD_AXIS: 175
OD_OVR_VA: 20/
OS_SPHERE: -3.00
OD_CYLINDER: -0.25

## 2024-11-12 ASSESSMENT — REFRACTION_MANIFEST
OS_SPHERE: -3.25
OS_VA1: 20/20
OD_SPHERE: -3.00
OD_VA1: 20/20

## 2024-11-12 ASSESSMENT — CONFRONTATIONAL VISUAL FIELD TEST (CVF)
OS_FINDINGS: FULL
OD_FINDINGS: FULL

## 2024-12-06 ENCOUNTER — RX RENEWAL (OUTPATIENT)
Age: 54
End: 2024-12-06

## 2025-01-02 ENCOUNTER — APPOINTMENT (OUTPATIENT)
Dept: CARDIOLOGY | Facility: CLINIC | Age: 55
End: 2025-01-02
Payer: COMMERCIAL

## 2025-01-02 VITALS
WEIGHT: 209 LBS | HEIGHT: 72 IN | HEART RATE: 66 BPM | BODY MASS INDEX: 28.31 KG/M2 | DIASTOLIC BLOOD PRESSURE: 90 MMHG | SYSTOLIC BLOOD PRESSURE: 138 MMHG | TEMPERATURE: 97.8 F | OXYGEN SATURATION: 99 %

## 2025-01-02 DIAGNOSIS — E78.5 HYPERLIPIDEMIA, UNSPECIFIED: ICD-10-CM

## 2025-01-02 DIAGNOSIS — I26.99 OTHER PULMONARY EMBOLISM W/OUT ACUTE COR PULMONALE: ICD-10-CM

## 2025-01-02 DIAGNOSIS — N13.8 BENIGN PROSTATIC HYPERPLASIA WITH LOWER URINARY TRACT SYMPMS: ICD-10-CM

## 2025-01-02 DIAGNOSIS — R73.9 HYPERGLYCEMIA, UNSPECIFIED: ICD-10-CM

## 2025-01-02 DIAGNOSIS — N40.1 BENIGN PROSTATIC HYPERPLASIA WITH LOWER URINARY TRACT SYMPMS: ICD-10-CM

## 2025-01-02 PROCEDURE — 99213 OFFICE O/P EST LOW 20 MIN: CPT

## 2025-03-10 ENCOUNTER — NON-APPOINTMENT (OUTPATIENT)
Age: 55
End: 2025-03-10

## 2025-03-10 ENCOUNTER — RX RENEWAL (OUTPATIENT)
Age: 55
End: 2025-03-10

## 2025-03-11 ENCOUNTER — APPOINTMENT (OUTPATIENT)
Dept: UROLOGY | Facility: CLINIC | Age: 55
End: 2025-03-11

## 2025-03-14 DIAGNOSIS — E06.3 AUTOIMMUNE THYROIDITIS: ICD-10-CM

## 2025-03-19 NOTE — H&P ADULT - NEUROLOGICAL DETAILS
Form needs to be completed by Surgeon Per Susan Olszewski APNP   responds to verbal commands/alert and oriented x 3/responds to pain

## 2025-04-05 ENCOUNTER — NON-APPOINTMENT (OUTPATIENT)
Age: 55
End: 2025-04-05

## 2025-04-07 ENCOUNTER — APPOINTMENT (OUTPATIENT)
Dept: AFTER HOURS CARE | Facility: EMERGENCY ROOM | Age: 55
End: 2025-04-07

## 2025-04-07 PROCEDURE — 99215 OFFICE O/P EST HI 40 MIN: CPT | Mod: 95

## 2025-04-09 ENCOUNTER — NON-APPOINTMENT (OUTPATIENT)
Age: 55
End: 2025-04-09

## 2025-04-10 ENCOUNTER — APPOINTMENT (OUTPATIENT)
Dept: CARDIOLOGY | Facility: CLINIC | Age: 55
End: 2025-04-10

## 2025-04-10 ENCOUNTER — APPOINTMENT (OUTPATIENT)
Facility: CLINIC | Age: 55
End: 2025-04-10
Payer: COMMERCIAL

## 2025-04-10 VITALS
BODY MASS INDEX: 26.14 KG/M2 | TEMPERATURE: 98.2 F | OXYGEN SATURATION: 100 % | HEIGHT: 72 IN | WEIGHT: 193 LBS | RESPIRATION RATE: 18 BRPM | SYSTOLIC BLOOD PRESSURE: 140 MMHG | DIASTOLIC BLOOD PRESSURE: 81 MMHG | HEART RATE: 65 BPM

## 2025-04-10 DIAGNOSIS — U07.1 COVID-19: ICD-10-CM

## 2025-04-10 DIAGNOSIS — R73.9 HYPERGLYCEMIA, UNSPECIFIED: ICD-10-CM

## 2025-04-10 DIAGNOSIS — I26.99 OTHER PULMONARY EMBOLISM W/OUT ACUTE COR PULMONALE: ICD-10-CM

## 2025-04-10 DIAGNOSIS — K51.90 ULCERATIVE COLITIS, UNSPECIFIED, W/OUT COMPLICATIONS: ICD-10-CM

## 2025-04-10 PROCEDURE — 99215 OFFICE O/P EST HI 40 MIN: CPT

## 2025-04-14 ENCOUNTER — APPOINTMENT (OUTPATIENT)
Dept: CARDIOLOGY | Facility: CLINIC | Age: 55
End: 2025-04-14
Payer: COMMERCIAL

## 2025-04-14 VITALS
WEIGHT: 195 LBS | SYSTOLIC BLOOD PRESSURE: 144 MMHG | BODY MASS INDEX: 26.41 KG/M2 | HEART RATE: 59 BPM | HEIGHT: 72 IN | DIASTOLIC BLOOD PRESSURE: 94 MMHG | OXYGEN SATURATION: 99 %

## 2025-04-14 DIAGNOSIS — N13.8 BENIGN PROSTATIC HYPERPLASIA WITH LOWER URINARY TRACT SYMPMS: ICD-10-CM

## 2025-04-14 DIAGNOSIS — N40.1 BENIGN PROSTATIC HYPERPLASIA WITH LOWER URINARY TRACT SYMPMS: ICD-10-CM

## 2025-04-14 DIAGNOSIS — I26.99 OTHER PULMONARY EMBOLISM W/OUT ACUTE COR PULMONALE: ICD-10-CM

## 2025-04-14 DIAGNOSIS — M54.50 LOW BACK PAIN, UNSPECIFIED: ICD-10-CM

## 2025-04-14 DIAGNOSIS — I10 ESSENTIAL (PRIMARY) HYPERTENSION: ICD-10-CM

## 2025-04-14 DIAGNOSIS — E06.3 AUTOIMMUNE THYROIDITIS: ICD-10-CM

## 2025-04-14 PROCEDURE — 99213 OFFICE O/P EST LOW 20 MIN: CPT

## 2025-06-09 ENCOUNTER — RX RENEWAL (OUTPATIENT)
Age: 55
End: 2025-06-09

## 2025-07-03 ENCOUNTER — APPOINTMENT (OUTPATIENT)
Dept: UROLOGY | Facility: CLINIC | Age: 55
End: 2025-07-03

## 2025-07-03 VITALS
RESPIRATION RATE: 16 BRPM | HEART RATE: 75 BPM | SYSTOLIC BLOOD PRESSURE: 153 MMHG | OXYGEN SATURATION: 99 % | HEIGHT: 72 IN | DIASTOLIC BLOOD PRESSURE: 92 MMHG | WEIGHT: 204.13 LBS | TEMPERATURE: 98 F | BODY MASS INDEX: 27.65 KG/M2

## 2025-07-03 PROBLEM — Z12.5 SCREENING PSA (PROSTATE SPECIFIC ANTIGEN): Status: ACTIVE | Noted: 2025-07-03

## 2025-07-03 PROBLEM — N52.9 MALE ERECTILE DISORDER: Status: ACTIVE | Noted: 2025-07-03

## 2025-07-03 PROCEDURE — 51798 US URINE CAPACITY MEASURE: CPT

## 2025-07-03 PROCEDURE — 99214 OFFICE O/P EST MOD 30 MIN: CPT | Mod: 25

## 2025-07-03 PROCEDURE — 51741 ELECTRO-UROFLOWMETRY FIRST: CPT

## 2025-07-12 ENCOUNTER — OUTPATIENT (OUTPATIENT)
Dept: OUTPATIENT SERVICES | Facility: HOSPITAL | Age: 55
LOS: 1 days | End: 2025-07-12
Payer: COMMERCIAL

## 2025-07-12 ENCOUNTER — APPOINTMENT (OUTPATIENT)
Dept: ULTRASOUND IMAGING | Facility: CLINIC | Age: 55
End: 2025-07-12
Payer: COMMERCIAL

## 2025-07-12 DIAGNOSIS — N40.1 BENIGN PROSTATIC HYPERPLASIA WITH LOWER URINARY TRACT SYMPTOMS: ICD-10-CM

## 2025-07-12 DIAGNOSIS — R33.9 RETENTION OF URINE, UNSPECIFIED: ICD-10-CM

## 2025-07-12 PROCEDURE — 76770 US EXAM ABDO BACK WALL COMP: CPT

## 2025-07-12 PROCEDURE — 76770 US EXAM ABDO BACK WALL COMP: CPT | Mod: 26

## 2025-07-15 ENCOUNTER — TRANSCRIPTION ENCOUNTER (OUTPATIENT)
Age: 55
End: 2025-07-15

## 2025-07-24 ENCOUNTER — APPOINTMENT (OUTPATIENT)
Dept: UROLOGY | Facility: CLINIC | Age: 55
End: 2025-07-24

## 2025-08-19 ENCOUNTER — APPOINTMENT (OUTPATIENT)
Facility: CLINIC | Age: 55
End: 2025-08-19

## 2025-08-21 ENCOUNTER — NON-APPOINTMENT (OUTPATIENT)
Age: 55
End: 2025-08-21

## 2025-09-08 ENCOUNTER — RX RENEWAL (OUTPATIENT)
Age: 55
End: 2025-09-08